# Patient Record
Sex: FEMALE | Race: WHITE | NOT HISPANIC OR LATINO | Employment: OTHER | URBAN - METROPOLITAN AREA
[De-identification: names, ages, dates, MRNs, and addresses within clinical notes are randomized per-mention and may not be internally consistent; named-entity substitution may affect disease eponyms.]

---

## 2017-05-15 ENCOUNTER — HISTORICAL (OUTPATIENT)
Dept: LAB | Facility: HOSPITAL | Age: 77
End: 2017-05-15

## 2017-05-15 LAB
BNP BLD-MCNC: 170 PG/ML (ref 0–125)
BUN SERPL-MCNC: 18 MG/DL (ref 7–18)
CALCIUM SERPL-MCNC: 9.2 MG/DL (ref 8.5–10.1)
CHLORIDE SERPL-SCNC: 103 MMOL/L (ref 98–107)
CO2 SERPL-SCNC: 29 MMOL/L (ref 21–32)
CREAT SERPL-MCNC: 0.75 MG/DL (ref 0.55–1.02)
GLUCOSE SERPL-MCNC: 73 MG/DL (ref 74–106)
POTASSIUM SERPL-SCNC: 4.4 MMOL/L (ref 3.5–5.1)
SODIUM SERPL-SCNC: 142 MMOL/L (ref 136–145)

## 2017-05-16 ENCOUNTER — HISTORICAL (OUTPATIENT)
Dept: CARDIOLOGY | Facility: HOSPITAL | Age: 77
End: 2017-05-16

## 2017-05-18 ENCOUNTER — HISTORICAL (OUTPATIENT)
Dept: LAB | Facility: HOSPITAL | Age: 77
End: 2017-05-18

## 2017-05-18 LAB
ABS NEUT (OLG): 5.15 X10(3)/MCL (ref 2.1–9.2)
ALBUMIN SERPL-MCNC: 3.6 GM/DL (ref 3.4–5)
ALBUMIN/GLOB SERPL: 1.1 {RATIO}
ALP SERPL-CCNC: 81 UNIT/L (ref 38–126)
ALT SERPL-CCNC: 30 UNIT/L (ref 12–78)
APPEARANCE, UA: CLEAR
APTT PPP: 27 SECOND(S) (ref 20.6–36)
AST SERPL-CCNC: 16 UNIT/L (ref 15–37)
BACTERIA SPEC CULT: ABNORMAL /HPF
BASOPHILS # BLD AUTO: 0 X10(3)/MCL (ref 0–0.2)
BASOPHILS NFR BLD AUTO: 1 %
BILIRUB SERPL-MCNC: 0.5 MG/DL (ref 0.2–1)
BILIRUB UR QL STRIP: NEGATIVE
BILIRUBIN DIRECT+TOT PNL SERPL-MCNC: 0.1 MG/DL (ref 0–0.2)
BILIRUBIN DIRECT+TOT PNL SERPL-MCNC: 0.4 MG/DL (ref 0–0.8)
BUN SERPL-MCNC: 19 MG/DL (ref 7–18)
CALCIUM SERPL-MCNC: 9 MG/DL (ref 8.5–10.1)
CHLORIDE SERPL-SCNC: 102 MMOL/L (ref 98–107)
CO2 SERPL-SCNC: 30 MMOL/L (ref 21–32)
COLOR UR: YELLOW
CREAT SERPL-MCNC: 0.92 MG/DL (ref 0.55–1.02)
EOSINOPHIL # BLD AUTO: 0.3 X10(3)/MCL (ref 0–0.9)
EOSINOPHIL NFR BLD AUTO: 3 %
ERYTHROCYTE [DISTWIDTH] IN BLOOD BY AUTOMATED COUNT: 15.8 % (ref 11.5–17)
GLOBULIN SER-MCNC: 3.2 GM/DL (ref 2.4–3.5)
GLUCOSE (UA): NEGATIVE
GLUCOSE SERPL-MCNC: 146 MG/DL (ref 74–106)
HCT VFR BLD AUTO: 47.2 % (ref 37–47)
HGB BLD-MCNC: 15.4 GM/DL (ref 12–16)
HGB UR QL STRIP: NEGATIVE
INR PPP: 1.03 (ref 0–1.27)
KETONES UR QL STRIP: NEGATIVE
LEUKOCYTE ESTERASE UR QL STRIP: ABNORMAL
LYMPHOCYTES # BLD AUTO: 2 X10(3)/MCL (ref 0.6–4.6)
LYMPHOCYTES NFR BLD AUTO: 25 %
MCH RBC QN AUTO: 31 PG (ref 27–31)
MCHC RBC AUTO-ENTMCNC: 32.6 GM/DL (ref 33–36)
MCV RBC AUTO: 95 FL (ref 80–94)
MONOCYTES # BLD AUTO: 0.5 X10(3)/MCL (ref 0.1–1.3)
MONOCYTES NFR BLD AUTO: 7 %
MRSA SCREEN BY PCR: NEGATIVE
NEUTROPHILS # BLD AUTO: 5.15 X10(3)/MCL (ref 1.4–7.9)
NEUTROPHILS NFR BLD AUTO: 64 %
NITRITE UR QL STRIP: NEGATIVE
PH UR STRIP: 5.5 [PH] (ref 5–9)
PLATELET # BLD AUTO: 245 X10(3)/MCL (ref 130–400)
PMV BLD AUTO: 9.7 FL (ref 9.4–12.4)
POTASSIUM SERPL-SCNC: 4.5 MMOL/L (ref 3.5–5.1)
PROT SERPL-MCNC: 6.8 GM/DL (ref 6.4–8.2)
PROT UR QL STRIP: NEGATIVE
PROTHROMBIN TIME: 13.3 SECOND(S) (ref 12.1–14.2)
RBC # BLD AUTO: 4.97 X10(6)/MCL (ref 4.2–5.4)
RBC #/AREA URNS HPF: ABNORMAL /[HPF]
SODIUM SERPL-SCNC: 139 MMOL/L (ref 136–145)
SP GR UR STRIP: 1.02 (ref 1–1.03)
SQUAMOUS EPITHELIAL, UA: ABNORMAL
UROBILINOGEN UR STRIP-ACNC: 0.2
WBC # SPEC AUTO: 8.1 X10(3)/MCL (ref 4.5–11.5)
WBC #/AREA URNS HPF: ABNORMAL /[HPF]

## 2017-06-23 ENCOUNTER — HISTORICAL (OUTPATIENT)
Dept: ADMINISTRATIVE | Facility: HOSPITAL | Age: 77
End: 2017-06-23

## 2017-07-14 ENCOUNTER — HISTORICAL (OUTPATIENT)
Dept: ADMINISTRATIVE | Facility: HOSPITAL | Age: 77
End: 2017-07-14

## 2017-07-14 LAB — ERYTHROCYTE [SEDIMENTATION RATE] IN BLOOD: 37 MM/HR (ref 0–20)

## 2019-06-26 ENCOUNTER — TELEPHONE (OUTPATIENT)
Dept: GYNECOLOGIC ONCOLOGY | Facility: CLINIC | Age: 79
End: 2019-06-26

## 2019-06-27 RX ORDER — DOCUSATE SODIUM 100 MG/1
100 CAPSULE, LIQUID FILLED ORAL DAILY PRN
COMMUNITY

## 2019-06-27 RX ORDER — APIXABAN 5 MG/1
5 TABLET, FILM COATED ORAL 2 TIMES DAILY
Refills: 1 | COMMUNITY
Start: 2019-04-30

## 2019-06-27 RX ORDER — METOPROLOL TARTRATE 50 MG/1
50 TABLET ORAL 2 TIMES DAILY
Refills: 1 | Status: ON HOLD | COMMUNITY
Start: 2019-04-30 | End: 2019-08-02 | Stop reason: SDUPTHER

## 2019-06-27 RX ORDER — HYDROCODONE BITARTRATE AND ACETAMINOPHEN 10; 325 MG/1; MG/1
TABLET ORAL
Refills: 0 | COMMUNITY
Start: 2019-05-08

## 2019-06-27 RX ORDER — GABAPENTIN 300 MG/1
300 CAPSULE ORAL NIGHTLY
Refills: 7 | COMMUNITY
Start: 2019-05-30

## 2019-06-27 RX ORDER — ASPIRIN 81 MG/1
81 TABLET ORAL DAILY
COMMUNITY
End: 2019-08-20

## 2019-07-02 ENCOUNTER — TELEPHONE (OUTPATIENT)
Dept: GYNECOLOGIC ONCOLOGY | Facility: CLINIC | Age: 79
End: 2019-07-02

## 2019-07-03 ENCOUNTER — DOCUMENTATION ONLY (OUTPATIENT)
Dept: GYNECOLOGIC ONCOLOGY | Facility: CLINIC | Age: 79
End: 2019-07-03

## 2019-07-03 ENCOUNTER — CLINICAL SUPPORT (OUTPATIENT)
Dept: GYNECOLOGIC ONCOLOGY | Facility: CLINIC | Age: 79
End: 2019-07-03
Payer: MEDICARE

## 2019-07-03 ENCOUNTER — RESEARCH ENCOUNTER (OUTPATIENT)
Dept: RESEARCH | Facility: OTHER | Age: 79
End: 2019-07-03

## 2019-07-03 ENCOUNTER — TELEPHONE (OUTPATIENT)
Dept: GYNECOLOGIC ONCOLOGY | Facility: CLINIC | Age: 79
End: 2019-07-03

## 2019-07-03 ENCOUNTER — HOSPITAL ENCOUNTER (OUTPATIENT)
Dept: RADIOLOGY | Facility: OTHER | Age: 79
Discharge: HOME | End: 2019-07-03
Attending: OBSTETRICS & GYNECOLOGY
Payer: MEDICARE

## 2019-07-03 ENCOUNTER — INITIAL CONSULT (OUTPATIENT)
Dept: GYNECOLOGIC ONCOLOGY | Facility: CLINIC | Age: 79
End: 2019-07-03
Payer: MEDICARE

## 2019-07-03 VITALS
HEART RATE: 51 BPM | HEIGHT: 64 IN | WEIGHT: 212.75 LBS | SYSTOLIC BLOOD PRESSURE: 136 MMHG | DIASTOLIC BLOOD PRESSURE: 65 MMHG | BODY MASS INDEX: 36.32 KG/M2

## 2019-07-03 DIAGNOSIS — C54.1 PAPILLARY SEROUS ENDOMETRIAL ADENOCARCINOMA: Primary | ICD-10-CM

## 2019-07-03 DIAGNOSIS — C54.1 PAPILLARY SEROUS ENDOMETRIAL ADENOCARCINOMA: ICD-10-CM

## 2019-07-03 PROCEDURE — 74177 CT ABD & PELVIS W/CONTRAST: CPT | Mod: 26,,, | Performed by: RADIOLOGY

## 2019-07-03 PROCEDURE — 99999 PR PBB SHADOW E&M-EST. PATIENT-LVL III: CPT | Mod: PBBFAC,,, | Performed by: OBSTETRICS & GYNECOLOGY

## 2019-07-03 PROCEDURE — 99205 PR OFFICE/OUTPT VISIT, NEW, LEVL V, 60-74 MIN: ICD-10-PCS | Mod: S$PBB,,, | Performed by: OBSTETRICS & GYNECOLOGY

## 2019-07-03 PROCEDURE — 99213 OFFICE O/P EST LOW 20 MIN: CPT | Mod: PBBFAC,25 | Performed by: OBSTETRICS & GYNECOLOGY

## 2019-07-03 PROCEDURE — 74177 CT CHEST ABDOMEN PELVIS WITH CONTRAST (XPD): ICD-10-PCS | Mod: 26,,, | Performed by: RADIOLOGY

## 2019-07-03 PROCEDURE — 99999 PR PBB SHADOW E&M-EST. PATIENT-LVL III: ICD-10-PCS | Mod: PBBFAC,,, | Performed by: OBSTETRICS & GYNECOLOGY

## 2019-07-03 PROCEDURE — 71260 CT THORAX DX C+: CPT | Mod: 26,,, | Performed by: RADIOLOGY

## 2019-07-03 PROCEDURE — 25500020 PHARM REV CODE 255: Performed by: OBSTETRICS & GYNECOLOGY

## 2019-07-03 PROCEDURE — 99205 OFFICE O/P NEW HI 60 MIN: CPT | Mod: S$PBB,,, | Performed by: OBSTETRICS & GYNECOLOGY

## 2019-07-03 PROCEDURE — 71260 CT CHEST ABDOMEN PELVIS WITH CONTRAST (XPD): ICD-10-PCS | Mod: 26,,, | Performed by: RADIOLOGY

## 2019-07-03 PROCEDURE — 74177 CT ABD & PELVIS W/CONTRAST: CPT | Mod: TC

## 2019-07-03 RX ADMIN — IOHEXOL 100 ML: 350 INJECTION, SOLUTION INTRAVENOUS at 02:07

## 2019-07-03 RX ADMIN — IOHEXOL 30 ML: 350 INJECTION, SOLUTION INTRAVENOUS at 01:07

## 2019-07-03 NOTE — LETTER
July 6, 2019      Qamar Pires MD  [address] [facility]  [place] [address]           [address]  [address], [address]  [facility] [address]  Phone: [phone]  Fax: [phone]          Patient: Shalini Alcaraz   MR Number: [id number]   YOB: 1940   Date of Visit: 7/3/2019       Dear Dr. Qamar Pires:    Thank you for referring Shalini Alcaraz to me for evaluation. Attached you will find relevant portions of my assessment and plan of care.    If you have questions, please do not hesitate to call me. I look forward to following Shalini Alcaraz along with you.    Sincerely,    Tamika Quesada MD    Enclosure  CC:  Carlos Newell MD    If you would like to receive this communication electronically, please contact [email or web address] or [phone] to request more information on [employer] access.    For providers and/or their staff who would like to refer a patient to [facility], please contact us through our one-stop-shop provider referral line, [facility], at [phone].    If you feel you have received this communication in error or would no longer like to receive these types of communications, please e-mail [email or web address]

## 2019-07-03 NOTE — PROGRESS NOTES
Referred by Dr. Qamar Pires for newly diagnosed endometrial cancer.    Postmenopausal bleeding.   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    Pelvic US 6/6/19  UT 4.8x3.8x8.0cm  Thickened endometrium 2.0cm  ROV 1.1cm and LOV 2.4cm  Left pelvic kidney and parapelvic cyst 4.1cm    Pap 6/3/19 negative.     Medical comorbidities include afib on long term anticoagulation with Eliquis and valve replacement, HTN     Prior abdominal surgeries include appendectomy, cholecystectomy. She has a pelvic kidney. BMI 31.      No family history of breast, colon, ovarian, or uterine cancer.

## 2019-07-03 NOTE — PROGRESS NOTES
Pt and her husand were approached in Gyn Onc clinic regarding participation in IRB protocol #2015.101.C. Pt was agreeable.     The Informed Consent Form (ICF) was reviewed with pt. The discussion included:   - participation is voluntary  - pt can change her mind about participating  - pt was informed that participation in this study would not preclude her from participating in any other research if offered  - specimens may be used by [facility] researchers, community researchers or research companies  - specimens collected include only those discussed with the patient at the time of consent and are indicated on the ICF   - specimens may be used for DNA, RNA or protein studies investigating biomarkers for diagnostic, prognostic or treatment purposes  - blood specimen will be collected from surgery after routine collections have been conducted  - excess tumor tissue will be collected from Pathology after their approval  - participation in Biobank study will not change amount of tissue removed  - all specimens released to researchers will be stripped of identifiers  - no personal medical information will be released to any parties outside of this research study  - there will be no other physical risks outside of those involved in standard of care procedure     Dr. Quesada approved of patient's participation in the Biobank study. Pt did not have any questions. Pt willingly and independently signed ICF.    A copy of signed ICF was given to pt with instructions to call with any questions that may arise or if she should change her mind regarding participation in Biobank study.

## 2019-07-03 NOTE — PROGRESS NOTES
Navigation intake completed. Pt is accompanied by her , retired internal med physician. Pt went for labs and CT today due to distant residence. Pt denies any concerns currently and expressed verbal understanding to contact office for further needs or concerns.

## 2019-07-05 ENCOUNTER — TELEPHONE (OUTPATIENT)
Dept: GYNECOLOGIC ONCOLOGY | Facility: CLINIC | Age: 79
End: 2019-07-05

## 2019-07-05 NOTE — TELEPHONE ENCOUNTER
Called and reviewed CT with patient. No obvious evidence of metastatic disease. Small subcentimeter pulm nodules, will follow with subsequent exam. Plan to proceed with surgery as scheduled. She voiced understanding, all questions answered.

## 2019-07-06 PROBLEM — C54.1 PAPILLARY SEROUS ENDOMETRIAL ADENOCARCINOMA: Status: ACTIVE | Noted: 2019-07-06

## 2019-07-06 RX ORDER — LIDOCAINE HYDROCHLORIDE 10 MG/ML
1 INJECTION, SOLUTION EPIDURAL; INFILTRATION; INTRACAUDAL; PERINEURAL ONCE
Status: CANCELLED | OUTPATIENT
Start: 2019-07-06 | End: 2019-07-06

## 2019-07-06 RX ORDER — SODIUM CHLORIDE 9 MG/ML
INJECTION, SOLUTION INTRAVENOUS CONTINUOUS
Status: CANCELLED | OUTPATIENT
Start: 2019-07-06

## 2019-07-06 NOTE — H&P (VIEW-ONLY)
Subjective:      Patient ID: Shalini Alcaraz is a 78 y.o. female.    Chief Complaint: high grade serous carcinoma (consult)      HPI  Referred by Dr. Qamar Pires for newly diagnosed endometrial cancer.     Postmenopausal bleeding.   EMB 19 showed high grade serous carcinoma.     Pelvic US 19  UT 4.8x3.8x8.0cm  Thickened endometrium 2.0cm  ROV 1.1cm and LOV 2.4cm  Left pelvic kidney and parapelvic cyst 4.1cm     Pap 6/3/19 negative.      Medical comorbidities include afib on long term anticoagulation with Eliquis and mitral valve replacement for MVP, HTN. Her cardiologist is Dr. Carlos Nweell in [place]. She has been off Eliquis since she started with postmenopausal bleeding in coordination with Dr. Newell.      Prior abdominal surgeries include open appendectomy, laparoscopic cholecystectomy.  x 3. She has a pelvic kidney. BMI 31.       No family history of breast, colon, ovarian, or uterine cancer.   Review of Systems   Constitutional: Negative for appetite change, chills, fatigue and fever.   HENT: Negative for mouth sores.    Respiratory: Negative for cough and shortness of breath.    Cardiovascular: Negative for leg swelling.   Gastrointestinal: Negative for abdominal pain, blood in stool, constipation and diarrhea.   Endocrine: Negative for cold intolerance.   Genitourinary: Negative for dysuria and vaginal bleeding.   Musculoskeletal: Negative for myalgias.   Skin: Negative for rash.   Allergic/Immunologic: Negative.    Neurological: Negative for weakness and numbness.   Hematological: Negative for adenopathy. Does not bruise/bleed easily.   Psychiatric/Behavioral: Negative for confusion.       Past Medical History:   Diagnosis Date    Anxiety     Atrial fibrillation     Avulsion fracture of left ankle     Collagenous colitis     Degenerative joint disease of spine     Hypertension     Low back pain     Lumbar facet arthropathy     Lumbar herniated disc     Lumbar radiculopathy      Lumbar spondylosis     Neuroforaminal stenosis of lumbar spine     Osteoarthritis     Papillary serous endometrial adenocarcinoma 2019    Post-menopausal bleeding     Spinal stenosis      Past Surgical History:   Procedure Laterality Date    AORTIC VALVE REPLACEMENT  2017    APPENDECTOMY      CHOLECYSTECTOMY      COLONOSCOPY W/ 2017    KNEE SURGERY      neural ablation       Family History   Problem Relation Age of Onset    Hypertension Mother     Lupus Sister      Social History     Socioeconomic History    Marital status:      Spouse name: Not on file    Number of children: Not on file    Years of education: Not on file    Highest education level: Not on file   Occupational History    Not on file   Social Needs    Financial resource strain: Not on file    Food insecurity:     Worry: Not on file     Inability: Not on file    Transportation needs:     Medical: Not on file     Non-medical: Not on file   Tobacco Use    Smoking status: Former Smoker     Last attempt to quit: 2018     Years since quittin.4   Substance and Sexual Activity    Alcohol use: Yes     Alcohol/week: 0.6 - 1.2 oz     Types: 1 - 2 Glasses of wine per week    Drug use: Never    Sexual activity: Not on file   Lifestyle    Physical activity:     Days per week: Not on file     Minutes per session: Not on file    Stress: Not on file   Relationships    Social connections:     Talks on phone: Not on file     Gets together: Not on file     Attends Cheondoism service: Not on file     Active member of club or organization: Not on file     Attends meetings of clubs or organizations: Not on file     Relationship status: Not on file   Other Topics Concern    Not on file   Social History Narrative    Not on file     Current Outpatient Medications   Medication Sig    aspirin (ECOTRIN) 81 MG EC tablet Take 81 mg by mouth once daily.    docusate sodium (COLACE) 100 MG capsule Take 100 mg by mouth  daily as needed for Constipation.    ELIQUIS 5 mg Tab Take 5 mg by mouth 2 (two) times daily.    gabapentin (NEURONTIN) 300 MG capsule Take 600 mg by mouth nightly.    glucosamine/chondr nickerson A sod (OSTEO BI-FLEX ORAL) Take by mouth once daily.    HYDROcodone-acetaminophen (NORCO)  mg per tablet TAKE 1 TABLET BY MOUTH EVERY 6 HOURS May cause drowsiness no alcohol    metoprolol tartrate (LOPRESSOR) 50 MG tablet Take 50 mg by mouth 2 (two) times daily.     No current facility-administered medications for this visit.      Review of patient's allergies indicates:  No Known Allergies    Objective:   Physical Exam:   Constitutional: She is oriented to person, place, and time. She appears well-developed and well-nourished.    HENT:   Head: Normocephalic and atraumatic.    Eyes: Pupils are equal, round, and reactive to light. EOM are normal.    Neck: Normal range of motion. Neck supple. No thyromegaly present.    Cardiovascular: Normal rate, regular rhythm and intact distal pulses.     Pulmonary/Chest: Effort normal and breath sounds normal. No respiratory distress. She has no wheezes.        Abdominal: Soft. Bowel sounds are normal. She exhibits no distension, no ascites and no mass. There is no tenderness.     Genitourinary: Rectum normal, vagina normal and uterus normal. Pelvic exam was performed with patient supine. There is no lesion on the right labia. There is no lesion on the left labia. Uterus is not enlarged and not fixed. Cervix is normal. Right adnexum displays no mass. Left adnexum displays no mass.           Musculoskeletal: Normal range of motion and moves all extremeties.      Lymphadenopathy:     She has no cervical adenopathy.        Right: No inguinal and no supraclavicular adenopathy present.        Left: No inguinal and no supraclavicular adenopathy present.    Neurological: She is alert and oriented to person, place, and time.    Skin: Skin is warm and dry. No rash noted.    Psychiatric: She  has a normal mood and affect.       Assessment:     1. Papillary serous endometrial adenocarcinoma        Plan:     Orders Placed This Encounter   Procedures    CT Chest Abdoment Pelvis With Contrast    Creatinine, serum       I discussed with the patient and her  the natural history of endometrial cancer. We also reviewed papillary serous histology which can be a more aggressive histology. Will obtain CT CAP for metastatic survey. Standard manage include surgical staging followed by adjuvant therapy based on surgicopathologic findings. She is a candidate for minimally invasive approach. Recommend RTLH/BSO/staging. She desires to proceed. The risks, benefits, and indications of the procedure were discussed with the patient and her family members if present.  These included bleeding, transfusion, infection, damage to surrounding tissues (bowel, bladder, ureter), wound separation, conversion to laparotomy, perioperative cardiac events, VTE, pneumonia, and possible death.  She voiced understanding, all questions were answered and consents were signed.  1. RTLH/BSO/staging 8/16/19, will try to move up surgery date if possible, she has a flexible schedule  2. CT CAP today  3. Pre op anesthesia consult  4. Continue holding Eliquis until after surgery    Copy to Adolfo Pires and Reece for coordination of care and care update.

## 2019-07-06 NOTE — PROGRESS NOTES
Subjective:      Patient ID: Shalini Alcaraz is a 78 y.o. female.    Chief Complaint: high grade serous carcinoma (consult)      HPI  Referred by Dr. Qamar Pires for newly diagnosed endometrial cancer.     Postmenopausal bleeding.   EMB 19 showed high grade serous carcinoma.     Pelvic US 19  UT 4.8x3.8x8.0cm  Thickened endometrium 2.0cm  ROV 1.1cm and LOV 2.4cm  Left pelvic kidney and parapelvic cyst 4.1cm     Pap 6/3/19 negative.      Medical comorbidities include afib on long term anticoagulation with Eliquis and mitral valve replacement for MVP, HTN. Her cardiologist is Dr. Carlos Newell in [place]. She has been off Eliquis since she started with postmenopausal bleeding in coordination with Dr. Newell.      Prior abdominal surgeries include open appendectomy, laparoscopic cholecystectomy.  x 3. She has a pelvic kidney. BMI 31.       No family history of breast, colon, ovarian, or uterine cancer.   Review of Systems   Constitutional: Negative for appetite change, chills, fatigue and fever.   HENT: Negative for mouth sores.    Respiratory: Negative for cough and shortness of breath.    Cardiovascular: Negative for leg swelling.   Gastrointestinal: Negative for abdominal pain, blood in stool, constipation and diarrhea.   Endocrine: Negative for cold intolerance.   Genitourinary: Negative for dysuria and vaginal bleeding.   Musculoskeletal: Negative for myalgias.   Skin: Negative for rash.   Allergic/Immunologic: Negative.    Neurological: Negative for weakness and numbness.   Hematological: Negative for adenopathy. Does not bruise/bleed easily.   Psychiatric/Behavioral: Negative for confusion.       Past Medical History:   Diagnosis Date    Anxiety     Atrial fibrillation     Avulsion fracture of left ankle     Collagenous colitis     Degenerative joint disease of spine     Hypertension     Low back pain     Lumbar facet arthropathy     Lumbar herniated disc     Lumbar radiculopathy      Lumbar spondylosis     Neuroforaminal stenosis of lumbar spine     Osteoarthritis     Papillary serous endometrial adenocarcinoma 2019    Post-menopausal bleeding     Spinal stenosis      Past Surgical History:   Procedure Laterality Date    AORTIC VALVE REPLACEMENT  2017    APPENDECTOMY      CHOLECYSTECTOMY      COLONOSCOPY W/ 2017    KNEE SURGERY      neural ablation       Family History   Problem Relation Age of Onset    Hypertension Mother     Lupus Sister      Social History     Socioeconomic History    Marital status:      Spouse name: Not on file    Number of children: Not on file    Years of education: Not on file    Highest education level: Not on file   Occupational History    Not on file   Social Needs    Financial resource strain: Not on file    Food insecurity:     Worry: Not on file     Inability: Not on file    Transportation needs:     Medical: Not on file     Non-medical: Not on file   Tobacco Use    Smoking status: Former Smoker     Last attempt to quit: 2018     Years since quittin.4   Substance and Sexual Activity    Alcohol use: Yes     Alcohol/week: 0.6 - 1.2 oz     Types: 1 - 2 Glasses of wine per week    Drug use: Never    Sexual activity: Not on file   Lifestyle    Physical activity:     Days per week: Not on file     Minutes per session: Not on file    Stress: Not on file   Relationships    Social connections:     Talks on phone: Not on file     Gets together: Not on file     Attends Pentecostalism service: Not on file     Active member of club or organization: Not on file     Attends meetings of clubs or organizations: Not on file     Relationship status: Not on file   Other Topics Concern    Not on file   Social History Narrative    Not on file     Current Outpatient Medications   Medication Sig    aspirin (ECOTRIN) 81 MG EC tablet Take 81 mg by mouth once daily.    docusate sodium (COLACE) 100 MG capsule Take 100 mg by mouth  daily as needed for Constipation.    ELIQUIS 5 mg Tab Take 5 mg by mouth 2 (two) times daily.    gabapentin (NEURONTIN) 300 MG capsule Take 600 mg by mouth nightly.    glucosamine/chondr nickerson A sod (OSTEO BI-FLEX ORAL) Take by mouth once daily.    HYDROcodone-acetaminophen (NORCO)  mg per tablet TAKE 1 TABLET BY MOUTH EVERY 6 HOURS May cause drowsiness no alcohol    metoprolol tartrate (LOPRESSOR) 50 MG tablet Take 50 mg by mouth 2 (two) times daily.     No current facility-administered medications for this visit.      Review of patient's allergies indicates:  No Known Allergies    Objective:   Physical Exam:   Constitutional: She is oriented to person, place, and time. She appears well-developed and well-nourished.    HENT:   Head: Normocephalic and atraumatic.    Eyes: Pupils are equal, round, and reactive to light. EOM are normal.    Neck: Normal range of motion. Neck supple. No thyromegaly present.    Cardiovascular: Normal rate, regular rhythm and intact distal pulses.     Pulmonary/Chest: Effort normal and breath sounds normal. No respiratory distress. She has no wheezes.        Abdominal: Soft. Bowel sounds are normal. She exhibits no distension, no ascites and no mass. There is no tenderness.     Genitourinary: Rectum normal, vagina normal and uterus normal. Pelvic exam was performed with patient supine. There is no lesion on the right labia. There is no lesion on the left labia. Uterus is not enlarged and not fixed. Cervix is normal. Right adnexum displays no mass. Left adnexum displays no mass.           Musculoskeletal: Normal range of motion and moves all extremeties.      Lymphadenopathy:     She has no cervical adenopathy.        Right: No inguinal and no supraclavicular adenopathy present.        Left: No inguinal and no supraclavicular adenopathy present.    Neurological: She is alert and oriented to person, place, and time.    Skin: Skin is warm and dry. No rash noted.    Psychiatric: She  has a normal mood and affect.       Assessment:     1. Papillary serous endometrial adenocarcinoma        Plan:     Orders Placed This Encounter   Procedures    CT Chest Abdoment Pelvis With Contrast    Creatinine, serum       I discussed with the patient and her  the natural history of endometrial cancer. We also reviewed papillary serous histology which can be a more aggressive histology. Will obtain CT CAP for metastatic survey. Standard manage include surgical staging followed by adjuvant therapy based on surgicopathologic findings. She is a candidate for minimally invasive approach. Recommend RTLH/BSO/staging. She desires to proceed. The risks, benefits, and indications of the procedure were discussed with the patient and her family members if present.  These included bleeding, transfusion, infection, damage to surrounding tissues (bowel, bladder, ureter), wound separation, conversion to laparotomy, perioperative cardiac events, VTE, pneumonia, and possible death.  She voiced understanding, all questions were answered and consents were signed.  1. RTLH/BSO/staging 8/16/19, will try to move up surgery date if possible, she has a flexible schedule  2. CT CAP today  3. Pre op anesthesia consult  4. Continue holding Eliquis until after surgery    Copy to Adolfo Pires and Reece for coordination of care and care update.

## 2019-07-08 ENCOUNTER — TELEPHONE (OUTPATIENT)
Dept: GYNECOLOGIC ONCOLOGY | Facility: CLINIC | Age: 79
End: 2019-07-08

## 2019-07-09 ENCOUNTER — TELEPHONE (OUTPATIENT)
Dept: GYNECOLOGIC ONCOLOGY | Facility: CLINIC | Age: 79
End: 2019-07-09

## 2019-07-10 ENCOUNTER — TELEPHONE (OUTPATIENT)
Dept: GYNECOLOGIC ONCOLOGY | Facility: CLINIC | Age: 79
End: 2019-07-10

## 2019-07-10 NOTE — TELEPHONE ENCOUNTER
Spoke with pt regarding moving her surgery date to a sooner date. Pt agreed to move her surgery to 7/31/19 at [facility]. Appt for surgery at [facility] cancelled and new appts with surgery information placed in mail.

## 2019-07-16 ENCOUNTER — ANESTHESIA EVENT (OUTPATIENT)
Dept: SURGERY | Facility: HOSPITAL | Age: 79
DRG: 734 | End: 2019-07-16
Payer: MEDICARE

## 2019-07-16 DIAGNOSIS — Z01.818 PREOP TESTING: Primary | ICD-10-CM

## 2019-07-16 NOTE — PRE-PROCEDURE INSTRUCTIONS
Spoke to pt . She lives in [place], [place]. Requesting her cardiologist & PCP for clearance for surgery. Pt coming in day before surgery.

## 2019-07-16 NOTE — ANESTHESIA PREPROCEDURE EVALUATION
[facility]  Anesthesia Pre-Operative Evaluation         Patient Name: Shalini Alcaraz  YOB: 1940  MRN: [id number]    SUBJECTIVE:     Pre-operative evaluation for Procedure(s) (LRB):  XI ROBOTIC HYSTERECTOMY (N/A)  XI ROBOTIC SALPINGO-OOPHORECTOMY (Bilateral)  STAGING (N/A)     07/31/2019    Shalini Alcaraz is a 78 y.o. female w/ a significant PMHx of newly diagnosed endometrial cancer (high grade serous carcinoma, a fib on apixaban s/p valve replacement, HTN, CHF- unknown EF presents for above procedure.     Pshx includes appendectomy, cholecystectomy.    Cleared by PCP for surgery.     Patient now presents for the above procedure(s).    LDA: None documented.     Prev airway: None documented.    Drips: None documented.      Patient Active Problem List   Diagnosis    Papillary serous endometrial adenocarcinoma       Review of patient's allergies indicates:   Allergen Reactions    Nsaids (non-steroidal anti-inflammatory drug)      Causes diarrhea    Pentothal [thiopental sodium]      Nausea & vomitting       Current Inpatient Medications:      No current facility-administered medications on file prior to encounter.      Current Outpatient Medications on File Prior to Encounter   Medication Sig Dispense Refill    aspirin (ECOTRIN) 81 MG EC tablet Take 81 mg by mouth once daily.      docusate sodium (COLACE) 100 MG capsule Take 100 mg by mouth daily as needed for Constipation.      ELIQUIS 5 mg Tab Take 5 mg by mouth 2 (two) times daily.  1    gabapentin (NEURONTIN) 300 MG capsule Take 300 mg by mouth every evening.   7    glucosamine/chondr nickerson A sod (OSTEO BI-FLEX ORAL) Take by mouth once daily.      HYDROcodone-acetaminophen (NORCO)  mg per tablet TAKE 1 TABLET BY MOUTH EVERY 6 HOURS May cause drowsiness no alcohol  0    metoprolol tartrate (LOPRESSOR) 50 MG tablet Take 50 mg by mouth 2 (two) times daily.  1       Past Surgical History:   Procedure Laterality Date    AORTIC  VALVE REPLACEMENT  2017    APPENDECTOMY      CHOLECYSTECTOMY      COLONOSCOPY W/   2017    KNEE SURGERY      neural ablation         Social History     Socioeconomic History    Marital status:      Spouse name: Not on file    Number of children: Not on file    Years of education: Not on file    Highest education level: Not on file   Occupational History    Not on file   Social Needs    Financial resource strain: Not on file    Food insecurity:     Worry: Not on file     Inability: Not on file    Transportation needs:     Medical: Not on file     Non-medical: Not on file   Tobacco Use    Smoking status: Former Smoker     Last attempt to quit: 2018     Years since quittin.5   Substance and Sexual Activity    Alcohol use: Yes     Alcohol/week: 0.6 - 1.2 oz     Types: 1 - 2 Glasses of wine per week    Drug use: Never    Sexual activity: Not on file   Lifestyle    Physical activity:     Days per week: Not on file     Minutes per session: Not on file    Stress: Not on file   Relationships    Social connections:     Talks on phone: Not on file     Gets together: Not on file     Attends Church service: Not on file     Active member of club or organization: Not on file     Attends meetings of clubs or organizations: Not on file     Relationship status: Not on file   Other Topics Concern    Not on file   Social History Narrative    Not on file       OBJECTIVE:     Vital Signs Range (Last 24H):  Temp:  [36.8 °C (98.3 °F)]   Pulse:  [95]   Resp:  [18]   BP: (124)/(76)   SpO2:  [97 %]       Significant Labs:  Lab Results   Component Value Date    CREATININE 0.8 2019       Diagnostic Studies: No relevant studies.    EKG: No recent studies available.    2D ECHO:  No results found for this or any previous visit.      ASSESSMENT/PLAN:                   Anesthesia Assessment: Preoperative EQUATION     Planned Procedure: Procedure(s) (LRB):  XI ROBOTIC HYSTERECTOMY (N/A)  XI  ROBOTIC SALPINGO-OOPHORECTOMY (Bilateral)  STAGING (N/A)  Requested Anesthesia Type:General  Surgeon: Tamika Quesada MD  Service: OB/GYN  Known or anticipated Date of Surgery:7/31/2019     Surgeon notes: reviewed     Electronic QUestionnaire Assessment completed via nurse interview with patient.         No Aq           Triage considerations:         Previous anesthesia records:Not available     Last PCP note: outside [facility]  Dr. Janeth López     Subspecialty notes: Cardiology: General Dr. Newell     Other important co-morbidities:   Papillary serous endometrial adencarcinoma  Anxiety  Atrial fibrillation  Degenerative joint disease  HTN  Low back pain  Lumbar radiculopathy  Stenosis of spine  Post-menopausal bleeding   Hx MVP  --- replacement 2017  Has been on eliquis & asa        Tests already available:  No recent tests.                            Instructions given. (See in Nurse's note)     Optimization:  Pt lives in [place] coming in the evening before    Medical Opinion Indicated                            Sub-specialist consult indicated:   cardiology                                    Plan:    Testing:  Hematology Profile, CMP, T&S and EKG   Pre-anesthesia  visit                                        Visit focus: pt lives in [place] last surgery 2017 aortic valve replacement                           Consultation:Patient's PCP for a statement of optimization  cardiology clearance                            Navigation: Tests Scheduled.                         Consults scheduled.                        Results will be tracked by Preop Clinic.                                                                                                                                      07/31/2019  Shalini Alcaraz is a 78 y.o., female.    Anesthesia Evaluation    I have reviewed the Patient Summary Reports.    I have reviewed the Nursing Notes.   I have reviewed the Medications.     Review of Systems  Anesthesia  Hx:  No problems with previous Anesthesia Denies Hx of Anesthetic complications States has had PONV in past with sodium pentothal History of prior surgery of interest to airway management or planning: Previous anesthesia: General mvp repair not  here with general anesthesia.  Denies Family Hx of Anesthesia complications.   Denies Personal Hx of Anesthesia complications.   Social:  Former Smoker, Social Alcohol Use    Hematology/Oncology:         -- Anemia: Current/Recent Cancer. Oncology Comments: Papillary serous endometrial adenocarcinoma    EENT/Dental:EENT/Dental Normal   Cardiovascular:   Exercise tolerance: poor Hypertension, well controlled Valvular problems/Murmurs, MVP Denies CAD.   Dysrhythmias atrial fibrillation  Denies Angina. Porcine mitral valve replacement 2017  Has been on asa & eliquis--- stopped Eliquis on her own July 4 because of vaginal bleeding. Cardiologist aware. Performed TTE no thrombus identified.    Pulmonary:  Pulmonary Normal  Denies Sleep Apnea.    Renal/:  Renal/ Normal     Hepatic/GI:  Hepatic/GI Normal  Denies GERD.    Musculoskeletal:   Arthritis  Fell in December 2018  Osteoarthritis Spine Disorders: lumbar Degenerative disease and Chronic Pain    Neurological:   Denies CVA. Denies Seizures. Lumbar radiculopathy   Chronic Pain Syndrome   Endocrine:  Endocrine Normal Denies Diabetes.    Psych:   anxiety          Physical Exam  General:  Well nourished    Airway/Jaw/Neck:  Airway Findings: Mouth Opening: Normal Tongue: Normal  General Airway Assessment: Adult  Mallampati: III  Improves to II with phonation.  TM Distance: Normal, at least 6 cm  Jaw/Neck Findings:  Micrognathia: Negative Neck ROM: Normal ROM  Neck Findings:      Dental:  Dental Findings: In tact, Periodontal disease, Mild   Chest/Lungs:  Chest/Lungs Findings: Clear to auscultation, Normal Respiratory Rate     Heart/Vascular:  Heart Findings: Rate: Normal  Rhythm: Regular Rhythm  Sounds: Normal      Abdomen:  Abdomen Findings:  Normal     Musculoskeletal:  Musculoskeletal Findings:    Skin:  Skin Findings:     Mental Status:  Mental Status Findings:  Alert and Oriented, Cooperative         Anesthesia Plan  Type of Anesthesia, risks & benefits discussed:  Anesthesia Type:  general  Patient's Preference:   Intra-op Monitoring Plan: standard ASA monitors and arterial line  Intra-op Monitoring Plan Comments:   Post Op Pain Control Plan: multimodal analgesia and IV/PO Opioids PRN  Post Op Pain Control Plan Comments:   Induction:   IV  Beta Blocker:  Patient is on a Beta-Blocker and has received one dose within the past 24 hours (No further documentation required).       Informed Consent: Patient understands risks and agrees with Anesthesia plan.  Questions answered. Anesthesia consent signed with patient.  ASA Score: 3     Day of Surgery Review of History & Physical:    H&P update referred to the surgeon.     Anesthesia Plan Notes: Plan GETA. Possible 2nd PIV post-induction. Porcine MVR off anticoagulation, recent TTE without thrombus. All questions answered. Informed consent obtained. Pt agrees to proceed.           Ready For Surgery From Anesthesia Perspective.

## 2019-07-16 NOTE — PRE ADMISSION SCREENING
Anesthesia Assessment: Preoperative EQUATION    Planned Procedure: Procedure(s) (LRB):  XI ROBOTIC HYSTERECTOMY (N/A)  XI ROBOTIC SALPINGO-OOPHORECTOMY (Bilateral)  STAGING (N/A)  Requested Anesthesia Type:General  Surgeon: Tamika Quesada MD  Service: OB/GYN  Known or anticipated Date of Surgery:7/31/2019    Surgeon notes: reviewed    Electronic QUestionnaire Assessment completed via nurse interview with patient.        No Aq        Triage considerations:       Previous anesthesia records:Not available    Last PCP note: outside [facility]  Dr. Janeth López    Subspecialty notes: Cardiology: General Dr. Newell    Other important co-morbidities:   Papillary serous endometrial adencarcinoma  Anxiety  Atrial fibrillation  Degenerative joint disease  HTN  Low back pain  Lumbar radiculopathy  Stenosis of spine  Post-menopausal bleeding   Hx MVP  --- replacement 2017  Has been on eliquis & asa      Tests already available:  No recent tests.            Instructions given. (See in Nurse's note)    Optimization:  Pt lives in [place] coming in the evening before    Medical Opinion Indicated       Sub-specialist consult indicated:   cardiology       Plan:    Testing:  Hematology Profile, CMP, T&S and EKG   Pre-anesthesia  visit       Visit focus: pt lives in [place] last surgery 2017 aortic valve replacement     Consultation:Patient's PCP for a statement of optimization  cardiology clearance      Navigation: Tests Scheduled.              Consults scheduled.             Results will be tracked by Preop Clinic.

## 2019-07-31 ENCOUNTER — ANESTHESIA (OUTPATIENT)
Dept: SURGERY | Facility: HOSPITAL | Age: 79
DRG: 734 | End: 2019-07-31
Payer: MEDICARE

## 2019-07-31 ENCOUNTER — HOSPITAL ENCOUNTER (INPATIENT)
Facility: HOSPITAL | Age: 79
LOS: 2 days | Discharge: HOME | DRG: 734 | End: 2019-08-02
Attending: OBSTETRICS & GYNECOLOGY | Admitting: OBSTETRICS & GYNECOLOGY
Payer: MEDICARE

## 2019-07-31 DIAGNOSIS — Z90.710 S/P TOTAL ABDOMINAL HYSTERECTOMY: ICD-10-CM

## 2019-07-31 DIAGNOSIS — R00.0 TACHYCARDIA: ICD-10-CM

## 2019-07-31 DIAGNOSIS — Z90.710 S/P TOTAL ABDOMINAL HYSTERECTOMY: Primary | ICD-10-CM

## 2019-07-31 DIAGNOSIS — C54.1 PAPILLARY SEROUS ENDOMETRIAL ADENOCARCINOMA: ICD-10-CM

## 2019-07-31 LAB — POCT GLUCOSE: 109 MG/DL (ref 70–110)

## 2019-07-31 PROCEDURE — 88342 TISSUE SPECIMEN TO PATHOLOGY - SURGERY: ICD-10-PCS | Mod: 26,59,, | Performed by: PATHOLOGY

## 2019-07-31 PROCEDURE — 99900035 HC TECH TIME PER 15 MIN (STAT)

## 2019-07-31 PROCEDURE — 63600175 PHARM REV CODE 636 W HCPCS: Performed by: OBSTETRICS & GYNECOLOGY

## 2019-07-31 PROCEDURE — 93010 ELECTROCARDIOGRAM REPORT: CPT | Mod: ,,, | Performed by: INTERNAL MEDICINE

## 2019-07-31 PROCEDURE — 88307 TISSUE SPECIMEN TO PATHOLOGY - SURGERY: ICD-10-PCS | Mod: 26,,, | Performed by: PATHOLOGY

## 2019-07-31 PROCEDURE — D9220A PRA ANESTHESIA: Mod: ,,, | Performed by: ANESTHESIOLOGY

## 2019-07-31 PROCEDURE — 71000039 HC RECOVERY, EACH ADD'L HOUR: Performed by: OBSTETRICS & GYNECOLOGY

## 2019-07-31 PROCEDURE — D9220A PRA ANESTHESIA: ICD-10-PCS | Mod: ,,, | Performed by: ANESTHESIOLOGY

## 2019-07-31 PROCEDURE — 88309 TISSUE EXAM BY PATHOLOGIST: CPT | Mod: 26,,, | Performed by: PATHOLOGY

## 2019-07-31 PROCEDURE — 88305 TISSUE SPECIMEN TO PATHOLOGY - SURGERY: ICD-10-PCS | Mod: 26,,, | Performed by: PATHOLOGY

## 2019-07-31 PROCEDURE — 63600175 PHARM REV CODE 636 W HCPCS

## 2019-07-31 PROCEDURE — 93005 ELECTROCARDIOGRAM TRACING: CPT

## 2019-07-31 PROCEDURE — 94761 N-INVAS EAR/PLS OXIMETRY MLT: CPT

## 2019-07-31 PROCEDURE — 38571 PR LAP,PELVIC LYMPHADENECTOMY: ICD-10-PCS | Mod: 52,AS,51, | Performed by: NURSE PRACTITIONER

## 2019-07-31 PROCEDURE — 25000003 PHARM REV CODE 250

## 2019-07-31 PROCEDURE — 58571 PR LAPAROSCOPY W TOT HYSTERECTUTERUS <=250 GRAM  W TUBE/OVARY: ICD-10-PCS | Mod: 22,AS,, | Performed by: NURSE PRACTITIONER

## 2019-07-31 PROCEDURE — 63600175 PHARM REV CODE 636 W HCPCS: Performed by: STUDENT IN AN ORGANIZED HEALTH CARE EDUCATION/TRAINING PROGRAM

## 2019-07-31 PROCEDURE — 88305 TISSUE EXAM BY PATHOLOGIST: CPT | Mod: 26,,, | Performed by: PATHOLOGY

## 2019-07-31 PROCEDURE — 20600001 HC STEP DOWN PRIVATE ROOM

## 2019-07-31 PROCEDURE — 63600175 PHARM REV CODE 636 W HCPCS: Performed by: ANESTHESIOLOGY

## 2019-07-31 PROCEDURE — 25000003 PHARM REV CODE 250: Performed by: STUDENT IN AN ORGANIZED HEALTH CARE EDUCATION/TRAINING PROGRAM

## 2019-07-31 PROCEDURE — 88360 TUMOR IMMUNOHISTOCHEM/MANUAL: CPT | Mod: 26,,, | Performed by: PATHOLOGY

## 2019-07-31 PROCEDURE — 27201423 OPTIME MED/SURG SUP & DEVICES STERILE SUPPLY: Performed by: OBSTETRICS & GYNECOLOGY

## 2019-07-31 PROCEDURE — 37000008 HC ANESTHESIA 1ST 15 MINUTES: Performed by: OBSTETRICS & GYNECOLOGY

## 2019-07-31 PROCEDURE — 58571 TLH W/T/O 250 G OR LESS: CPT | Mod: 22,,, | Performed by: OBSTETRICS & GYNECOLOGY

## 2019-07-31 PROCEDURE — 88341 TISSUE SPECIMEN TO PATHOLOGY - SURGERY: ICD-10-PCS | Mod: 26,59,, | Performed by: PATHOLOGY

## 2019-07-31 PROCEDURE — 93010 EKG 12-LEAD: ICD-10-PCS | Mod: ,,, | Performed by: INTERNAL MEDICINE

## 2019-07-31 PROCEDURE — 88309 TISSUE SPECIMEN TO PATHOLOGY - SURGERY: ICD-10-PCS | Mod: 26,,, | Performed by: PATHOLOGY

## 2019-07-31 PROCEDURE — 82962 GLUCOSE BLOOD TEST: CPT | Performed by: OBSTETRICS & GYNECOLOGY

## 2019-07-31 PROCEDURE — 37000009 HC ANESTHESIA EA ADD 15 MINS: Performed by: OBSTETRICS & GYNECOLOGY

## 2019-07-31 PROCEDURE — 71000033 HC RECOVERY, INTIAL HOUR: Performed by: OBSTETRICS & GYNECOLOGY

## 2019-07-31 PROCEDURE — 88342 IMHCHEM/IMCYTCHM 1ST ANTB: CPT | Mod: 26,59,, | Performed by: PATHOLOGY

## 2019-07-31 PROCEDURE — 36000712 HC OR TIME LEV V 1ST 15 MIN: Performed by: OBSTETRICS & GYNECOLOGY

## 2019-07-31 PROCEDURE — 88360 PR  TUMOR IMMUNOHISTOCHEM/MANUAL: ICD-10-PCS | Mod: 26,,, | Performed by: PATHOLOGY

## 2019-07-31 PROCEDURE — 25000003 PHARM REV CODE 250: Performed by: OBSTETRICS & GYNECOLOGY

## 2019-07-31 PROCEDURE — 58571 PR LAPAROSCOPY W TOT HYSTERECTUTERUS <=250 GRAM  W TUBE/OVARY: ICD-10-PCS | Mod: 22,,, | Performed by: OBSTETRICS & GYNECOLOGY

## 2019-07-31 PROCEDURE — 38572 LAPAROSCOPY LYMPHADENECTOMY: CPT | Mod: 52,51,, | Performed by: OBSTETRICS & GYNECOLOGY

## 2019-07-31 PROCEDURE — 58571 TLH W/T/O 250 G OR LESS: CPT | Mod: 22,AS,, | Performed by: NURSE PRACTITIONER

## 2019-07-31 PROCEDURE — 36000713 HC OR TIME LEV V EA ADD 15 MIN: Performed by: OBSTETRICS & GYNECOLOGY

## 2019-07-31 PROCEDURE — 88341 IMHCHEM/IMCYTCHM EA ADD ANTB: CPT | Mod: 26,59,, | Performed by: PATHOLOGY

## 2019-07-31 PROCEDURE — 88307 TISSUE EXAM BY PATHOLOGIST: CPT | Performed by: PATHOLOGY

## 2019-07-31 PROCEDURE — 38571 LAPAROSCOPY LYMPHADENECTOMY: CPT | Mod: 52,AS,51, | Performed by: NURSE PRACTITIONER

## 2019-07-31 PROCEDURE — 38572 PR LAP,PELVIC LYMPHADENECTOMY/BX: ICD-10-PCS | Mod: 52,51,, | Performed by: OBSTETRICS & GYNECOLOGY

## 2019-07-31 RX ORDER — METOPROLOL TARTRATE 50 MG/1
50 TABLET ORAL 2 TIMES DAILY
Status: DISCONTINUED | OUTPATIENT
Start: 2019-07-31 | End: 2019-08-01

## 2019-07-31 RX ORDER — DOCUSATE SODIUM 100 MG/1
100 CAPSULE, LIQUID FILLED ORAL 2 TIMES DAILY
Status: DISCONTINUED | OUTPATIENT
Start: 2019-07-31 | End: 2019-08-02 | Stop reason: HOSPADM

## 2019-07-31 RX ORDER — ONDANSETRON 2 MG/ML
4 INJECTION INTRAMUSCULAR; INTRAVENOUS DAILY PRN
Status: DISCONTINUED | OUTPATIENT
Start: 2019-07-31 | End: 2019-07-31 | Stop reason: HOSPADM

## 2019-07-31 RX ORDER — ACETAMINOPHEN 500 MG
1000 TABLET ORAL ONCE
Status: COMPLETED | OUTPATIENT
Start: 2019-07-31 | End: 2019-07-31

## 2019-07-31 RX ORDER — LIDOCAINE HCL/PF 100 MG/5ML
SYRINGE (ML) INTRAVENOUS
Status: DISCONTINUED | OUTPATIENT
Start: 2019-07-31 | End: 2019-07-31

## 2019-07-31 RX ORDER — GLYCOPYRROLATE 0.2 MG/ML
INJECTION INTRAMUSCULAR; INTRAVENOUS
Status: DISCONTINUED | OUTPATIENT
Start: 2019-07-31 | End: 2019-07-31

## 2019-07-31 RX ORDER — SODIUM CHLORIDE 0.9 % (FLUSH) 0.9 %
3 SYRINGE (ML) INJECTION EVERY 8 HOURS PRN
Status: DISCONTINUED | OUTPATIENT
Start: 2019-07-31 | End: 2019-07-31 | Stop reason: HOSPADM

## 2019-07-31 RX ORDER — OXYCODONE AND ACETAMINOPHEN 5; 325 MG/1; MG/1
1 TABLET ORAL EVERY 4 HOURS PRN
Status: DISCONTINUED | OUTPATIENT
Start: 2019-07-31 | End: 2019-08-02 | Stop reason: HOSPADM

## 2019-07-31 RX ORDER — NEOSTIGMINE METHYLSULFATE 1 MG/ML
INJECTION, SOLUTION INTRAVENOUS
Status: DISCONTINUED | OUTPATIENT
Start: 2019-07-31 | End: 2019-07-31

## 2019-07-31 RX ORDER — ONDANSETRON 2 MG/ML
INJECTION INTRAMUSCULAR; INTRAVENOUS
Status: DISCONTINUED | OUTPATIENT
Start: 2019-07-31 | End: 2019-07-31

## 2019-07-31 RX ORDER — METOPROLOL TARTRATE 1 MG/ML
INJECTION, SOLUTION INTRAVENOUS
Status: DISCONTINUED | OUTPATIENT
Start: 2019-07-31 | End: 2019-07-31

## 2019-07-31 RX ORDER — HYDROMORPHONE HYDROCHLORIDE 1 MG/ML
INJECTION, SOLUTION INTRAMUSCULAR; INTRAVENOUS; SUBCUTANEOUS
Status: COMPLETED
Start: 2019-07-31 | End: 2019-07-31

## 2019-07-31 RX ORDER — SODIUM CHLORIDE 9 MG/ML
INJECTION, SOLUTION INTRAVENOUS CONTINUOUS PRN
Status: DISCONTINUED | OUTPATIENT
Start: 2019-07-31 | End: 2019-07-31

## 2019-07-31 RX ORDER — GABAPENTIN 300 MG/1
300 CAPSULE ORAL NIGHTLY
Status: DISCONTINUED | OUTPATIENT
Start: 2019-07-31 | End: 2019-08-02 | Stop reason: HOSPADM

## 2019-07-31 RX ORDER — PROPOFOL 10 MG/ML
VIAL (ML) INTRAVENOUS
Status: DISCONTINUED | OUTPATIENT
Start: 2019-07-31 | End: 2019-07-31

## 2019-07-31 RX ORDER — SODIUM CHLORIDE 9 MG/ML
INJECTION, SOLUTION INTRAVENOUS CONTINUOUS
Status: DISCONTINUED | OUTPATIENT
Start: 2019-07-31 | End: 2019-07-31

## 2019-07-31 RX ORDER — LIDOCAINE HYDROCHLORIDE 10 MG/ML
1 INJECTION, SOLUTION EPIDURAL; INFILTRATION; INTRACAUDAL; PERINEURAL ONCE
Status: COMPLETED | OUTPATIENT
Start: 2019-07-31 | End: 2019-07-31

## 2019-07-31 RX ORDER — ESMOLOL HYDROCHLORIDE 10 MG/ML
INJECTION INTRAVENOUS
Status: DISCONTINUED | OUTPATIENT
Start: 2019-07-31 | End: 2019-07-31

## 2019-07-31 RX ORDER — CEFAZOLIN SODIUM 1 G/3ML
2 INJECTION, POWDER, FOR SOLUTION INTRAMUSCULAR; INTRAVENOUS
Status: COMPLETED | OUTPATIENT
Start: 2019-07-31 | End: 2019-07-31

## 2019-07-31 RX ORDER — OXYCODONE AND ACETAMINOPHEN 10; 325 MG/1; MG/1
TABLET ORAL
Status: COMPLETED
Start: 2019-07-31 | End: 2019-07-31

## 2019-07-31 RX ORDER — HYDROMORPHONE HYDROCHLORIDE 1 MG/ML
1 INJECTION, SOLUTION INTRAMUSCULAR; INTRAVENOUS; SUBCUTANEOUS EVERY 4 HOURS PRN
Status: DISCONTINUED | OUTPATIENT
Start: 2019-07-31 | End: 2019-08-02 | Stop reason: HOSPADM

## 2019-07-31 RX ORDER — OXYCODONE AND ACETAMINOPHEN 10; 325 MG/1; MG/1
1 TABLET ORAL EVERY 4 HOURS PRN
Status: DISCONTINUED | OUTPATIENT
Start: 2019-07-31 | End: 2019-08-02 | Stop reason: HOSPADM

## 2019-07-31 RX ORDER — ROCURONIUM BROMIDE 10 MG/ML
INJECTION, SOLUTION INTRAVENOUS
Status: DISCONTINUED | OUTPATIENT
Start: 2019-07-31 | End: 2019-07-31

## 2019-07-31 RX ORDER — HYDROMORPHONE HYDROCHLORIDE 1 MG/ML
0.5 INJECTION, SOLUTION INTRAMUSCULAR; INTRAVENOUS; SUBCUTANEOUS EVERY 5 MIN PRN
Status: COMPLETED | OUTPATIENT
Start: 2019-07-31 | End: 2019-07-31

## 2019-07-31 RX ORDER — SIMETHICONE 80 MG
80 TABLET,CHEWABLE ORAL EVERY 4 HOURS PRN
Status: DISCONTINUED | OUTPATIENT
Start: 2019-07-31 | End: 2019-08-01 | Stop reason: SDUPTHER

## 2019-07-31 RX ORDER — DEXAMETHASONE SODIUM PHOSPHATE 4 MG/ML
INJECTION, SOLUTION INTRA-ARTICULAR; INTRALESIONAL; INTRAMUSCULAR; INTRAVENOUS; SOFT TISSUE
Status: DISCONTINUED | OUTPATIENT
Start: 2019-07-31 | End: 2019-07-31

## 2019-07-31 RX ORDER — MIDAZOLAM HYDROCHLORIDE 1 MG/ML
INJECTION, SOLUTION INTRAMUSCULAR; INTRAVENOUS
Status: DISCONTINUED | OUTPATIENT
Start: 2019-07-31 | End: 2019-07-31

## 2019-07-31 RX ORDER — METOPROLOL TARTRATE 1 MG/ML
5 INJECTION, SOLUTION INTRAVENOUS ONCE
Status: DISCONTINUED | OUTPATIENT
Start: 2019-08-01 | End: 2019-07-31

## 2019-07-31 RX ORDER — METOPROLOL TARTRATE 50 MG/1
50 TABLET ORAL ONCE
Status: COMPLETED | OUTPATIENT
Start: 2019-08-01 | End: 2019-07-31

## 2019-07-31 RX ORDER — FENTANYL CITRATE 50 UG/ML
INJECTION, SOLUTION INTRAMUSCULAR; INTRAVENOUS
Status: DISCONTINUED | OUTPATIENT
Start: 2019-07-31 | End: 2019-07-31

## 2019-07-31 RX ORDER — SODIUM CHLORIDE, SODIUM LACTATE, POTASSIUM CHLORIDE, CALCIUM CHLORIDE 600; 310; 30; 20 MG/100ML; MG/100ML; MG/100ML; MG/100ML
INJECTION, SOLUTION INTRAVENOUS CONTINUOUS
Status: DISCONTINUED | OUTPATIENT
Start: 2019-07-31 | End: 2019-08-02 | Stop reason: HOSPADM

## 2019-07-31 RX ORDER — DIPHENHYDRAMINE HCL 25 MG
25 CAPSULE ORAL EVERY 4 HOURS PRN
Status: DISCONTINUED | OUTPATIENT
Start: 2019-07-31 | End: 2019-08-02 | Stop reason: HOSPADM

## 2019-07-31 RX ORDER — ONDANSETRON 8 MG/1
8 TABLET, ORALLY DISINTEGRATING ORAL EVERY 8 HOURS PRN
Status: DISCONTINUED | OUTPATIENT
Start: 2019-07-31 | End: 2019-08-02 | Stop reason: HOSPADM

## 2019-07-31 RX ORDER — PHENYLEPHRINE HYDROCHLORIDE 10 MG/ML
INJECTION INTRAVENOUS
Status: DISCONTINUED | OUTPATIENT
Start: 2019-07-31 | End: 2019-07-31

## 2019-07-31 RX ADMIN — ROCURONIUM BROMIDE 20 MG: 10 INJECTION, SOLUTION INTRAVENOUS at 02:07

## 2019-07-31 RX ADMIN — SODIUM CHLORIDE, SODIUM GLUCONATE, SODIUM ACETATE, POTASSIUM CHLORIDE, MAGNESIUM CHLORIDE, SODIUM PHOSPHATE, DIBASIC, AND POTASSIUM PHOSPHATE: .53; .5; .37; .037; .03; .012; .00082 INJECTION, SOLUTION INTRAVENOUS at 12:07

## 2019-07-31 RX ADMIN — OXYCODONE HYDROCHLORIDE AND ACETAMINOPHEN 1 TABLET: 10; 325 TABLET ORAL at 09:07

## 2019-07-31 RX ADMIN — HYDROMORPHONE HYDROCHLORIDE 0.5 MG: 1 INJECTION, SOLUTION INTRAMUSCULAR; INTRAVENOUS; SUBCUTANEOUS at 06:07

## 2019-07-31 RX ADMIN — PHENYLEPHRINE HYDROCHLORIDE 200 MCG: 10 INJECTION INTRAVENOUS at 12:07

## 2019-07-31 RX ADMIN — ROCURONIUM BROMIDE 20 MG: 10 INJECTION, SOLUTION INTRAVENOUS at 01:07

## 2019-07-31 RX ADMIN — HYDROMORPHONE HYDROCHLORIDE 0.5 MG: 1 INJECTION, SOLUTION INTRAMUSCULAR; INTRAVENOUS; SUBCUTANEOUS at 05:07

## 2019-07-31 RX ADMIN — ONDANSETRON 4 MG: 2 INJECTION INTRAMUSCULAR; INTRAVENOUS at 03:07

## 2019-07-31 RX ADMIN — PHENYLEPHRINE HYDROCHLORIDE 100 MCG: 10 INJECTION INTRAVENOUS at 02:07

## 2019-07-31 RX ADMIN — GLYCOPYRROLATE 0.6 MG: 0.2 INJECTION, SOLUTION INTRAMUSCULAR; INTRAVENOUS at 04:07

## 2019-07-31 RX ADMIN — METOPROLOL TARTRATE 1 MG: 1 INJECTION, SOLUTION INTRAVENOUS at 03:07

## 2019-07-31 RX ADMIN — PROPOFOL 150 MG: 10 INJECTION, EMULSION INTRAVENOUS at 11:07

## 2019-07-31 RX ADMIN — DEXAMETHASONE SODIUM PHOSPHATE 4 MG: 4 INJECTION, SOLUTION INTRAMUSCULAR; INTRAVENOUS at 01:07

## 2019-07-31 RX ADMIN — HYDROMORPHONE HYDROCHLORIDE 0.5 MG: 1 INJECTION, SOLUTION INTRAMUSCULAR; INTRAVENOUS; SUBCUTANEOUS at 04:07

## 2019-07-31 RX ADMIN — LIDOCAINE HYDROCHLORIDE 80 MG: 20 INJECTION, SOLUTION INTRAVENOUS at 11:07

## 2019-07-31 RX ADMIN — METOPROLOL TARTRATE 50 MG: 50 TABLET ORAL at 11:07

## 2019-07-31 RX ADMIN — PHENYLEPHRINE HYDROCHLORIDE 100 MCG: 10 INJECTION INTRAVENOUS at 12:07

## 2019-07-31 RX ADMIN — SODIUM CHLORIDE: 0.9 INJECTION, SOLUTION INTRAVENOUS at 10:07

## 2019-07-31 RX ADMIN — SODIUM CHLORIDE: 0.9 INJECTION, SOLUTION INTRAVENOUS at 11:07

## 2019-07-31 RX ADMIN — METOPROLOL TARTRATE 2.5 MG: 1 INJECTION, SOLUTION INTRAVENOUS at 12:07

## 2019-07-31 RX ADMIN — PHENYLEPHRINE HYDROCHLORIDE 100 MCG: 10 INJECTION INTRAVENOUS at 01:07

## 2019-07-31 RX ADMIN — METOPROLOL TARTRATE 50 MG: 50 TABLET ORAL at 07:07

## 2019-07-31 RX ADMIN — SODIUM CHLORIDE, SODIUM LACTATE, POTASSIUM CHLORIDE, AND CALCIUM CHLORIDE: .6; .31; .03; .02 INJECTION, SOLUTION INTRAVENOUS at 04:07

## 2019-07-31 RX ADMIN — MIDAZOLAM HYDROCHLORIDE 2 MG: 1 INJECTION, SOLUTION INTRAMUSCULAR; INTRAVENOUS at 11:07

## 2019-07-31 RX ADMIN — PHENYLEPHRINE HYDROCHLORIDE 200 MCG: 10 INJECTION INTRAVENOUS at 02:07

## 2019-07-31 RX ADMIN — ESMOLOL HYDROCHLORIDE 20 MG: 10 INJECTION INTRAVENOUS at 11:07

## 2019-07-31 RX ADMIN — LIDOCAINE HYDROCHLORIDE: 10 INJECTION, SOLUTION EPIDURAL; INFILTRATION; INTRACAUDAL; PERINEURAL at 10:07

## 2019-07-31 RX ADMIN — ROCURONIUM BROMIDE 10 MG: 10 INJECTION, SOLUTION INTRAVENOUS at 02:07

## 2019-07-31 RX ADMIN — DOCUSATE SODIUM 100 MG: 100 CAPSULE, LIQUID FILLED ORAL at 09:07

## 2019-07-31 RX ADMIN — GABAPENTIN 300 MG: 300 CAPSULE ORAL at 09:07

## 2019-07-31 RX ADMIN — OXYCODONE HYDROCHLORIDE AND ACETAMINOPHEN 1 TABLET: 10; 325 TABLET ORAL at 04:07

## 2019-07-31 RX ADMIN — CEFAZOLIN 2 G: 330 INJECTION, POWDER, FOR SOLUTION INTRAMUSCULAR; INTRAVENOUS at 12:07

## 2019-07-31 RX ADMIN — ACETAMINOPHEN 1000 MG: 500 TABLET ORAL at 11:07

## 2019-07-31 RX ADMIN — ROCURONIUM BROMIDE 10 MG: 10 INJECTION, SOLUTION INTRAVENOUS at 03:07

## 2019-07-31 RX ADMIN — ROCURONIUM BROMIDE 10 MG: 10 INJECTION, SOLUTION INTRAVENOUS at 12:07

## 2019-07-31 RX ADMIN — NEOSTIGMINE METHYLSULFATE 5 MG: 1 INJECTION INTRAVENOUS at 04:07

## 2019-07-31 RX ADMIN — PHENYLEPHRINE HYDROCHLORIDE 200 MCG: 10 INJECTION INTRAVENOUS at 01:07

## 2019-07-31 RX ADMIN — ROCURONIUM BROMIDE 40 MG: 10 INJECTION, SOLUTION INTRAVENOUS at 11:07

## 2019-07-31 RX ADMIN — FENTANYL CITRATE 100 MCG: 50 INJECTION, SOLUTION INTRAMUSCULAR; INTRAVENOUS at 11:07

## 2019-07-31 RX ADMIN — SODIUM CHLORIDE 0.5 MCG/KG/MIN: 9 INJECTION, SOLUTION INTRAVENOUS at 02:07

## 2019-07-31 RX ADMIN — PHENYLEPHRINE HYDROCHLORIDE 200 MCG: 10 INJECTION INTRAVENOUS at 11:07

## 2019-07-31 RX ADMIN — ESMOLOL HYDROCHLORIDE 30 MG: 10 INJECTION INTRAVENOUS at 11:07

## 2019-07-31 NOTE — OR NURSING
Specimen to pathology:   1.) Uterus, cervix, bilateral tubes and ovaries. (permanent)  2.) Right pelvic and obturator lymph nodes ( permanent)  3.) Right kalyan aortic lymph nodes (permanent)  4.) Omentum (permanent)    Family updated via epic messenger throughout the case.

## 2019-07-31 NOTE — OPERATIVE NOTE ADDENDUM
Certification of Assistant at Surgery       Surgery Date: 7/31/2019     Participating Surgeons:  Surgeon(s) and Role:     * Tamika Quesada MD - Primary     * Jhoana Shelley MD - Resident - Assisting    Procedures:  Procedure(s) (LRB):  XI ROBOTIC HYSTERECTOMY (N/A)  XI ROBOTIC SALPINGO-OOPHORECTOMY (Bilateral)  STAGING (N/A)    Assistant Surgeon's Certification of Necessity:  I understand that section 1842 (b) (6) (d) of the Social Security Act generally prohibits Medicare Part B reasonable charge payment for the services of assistants at surgery in teaching hospitals when qualified residents are available to furnish such services. I certify that the services for which payment is claimed were medically necessary, and that no qualified resident was available to perform the services. I further understand that these services are subject to post-payment review by the Medicare carrier.      Debbie Mata NP    07/31/2019  3:49 PM

## 2019-07-31 NOTE — PROGRESS NOTES
Spoke with Dr. Liriano regarding patients current rate of A-fib 's. Per MD richey for telemetry monitoring and to administer night dose of Metoprolol PO now.

## 2019-07-31 NOTE — BRIEF OP NOTE
[facility]  Brief Op Note    Admit Date: 7/31/2019    Attending Physician: Tamika Quesada MD     Surgery Date: 7/31/2019     Surgeon(s) and Role:     * Tamika Quesada MD - Primary     * Jhoana Shelley MD - Resident - Assisting        Pre-op Diagnosis:  Papillary serous endometrial adenocarcinoma [C54.1]    Post-op Diagnosis:  Post-Op Diagnosis Codes:     * Papillary serous endometrial adenocarcinoma [C54.1]    Procedure(s) (LRB):  XI ROBOTIC HYSTERECTOMY (N/A)  XI ROBOTIC SALPINGO-OOPHORECTOMY (Bilateral)  XI ROBOTIC LYMPHADENECTOMY, PELVIC (Right)  LYMPHADENECTOMY, PARA-AORTIC (Right)  OMENTECTOMY (N/A)    Anesthesia: General    Findings/Key Components:   1. Uterus sounded to 8cm   2. Left ovary and tube, round ligament noted to be  from uterus. Adherent to left pelvic sidewall. Successfully removed  3. Routine TLH- RSO  4. Both ureters noted to be vermiculating   5. Right illiac and obturator lymph nodes dissected out   6. Left pelvic kidney noted, did not attempt left pelvic lymph node dissection due to aberrant vasculature   8. Right periaortic lymph nodes dissected   9. Mini lap created to achieve omentectomy, omentum usually thick. Successfully closed  10. All lap sites c/d/i. Hemostasis noted     Estimated Blood Loss: minimal          Specimens:   Specimen (12h ago, onward)    Start     Ordered    07/31/19 1609  Specimen to Pathology - Surgery  Once     Comments:  1.) Uterus, cervix, bilateral tubes and ovaries. (permanent)2.) Right pelvic and obturator lymph nodes ( permanent)3.) Right kalyan aortic lymph nodes (permanent)4.) Omentum (permanent)     Start Status     07/31/19 1609 Collected (07/31/19 1608) Order ID: [id number]       07/31/19 1608              Jhoana Shelley M.D.  [facility]

## 2019-07-31 NOTE — INTERVAL H&P NOTE
The patient has been examined and the H&P has been reviewed:    I concur with the findings and no changes have occurred since H&P was written.    Surgery risks, benefits and alternative options discussed and understood by patient/family.          Active Hospital Problems    Diagnosis  POA    Papillary serous endometrial adenocarcinoma [C54.1]  Yes      Resolved Hospital Problems   No resolved problems to display.

## 2019-07-31 NOTE — TRANSFER OF CARE
"Anesthesia Transfer of Care Note    Patient: Shalini Alcaraz    Procedure(s) Performed: Procedure(s) (LRB):  XI ROBOTIC HYSTERECTOMY (N/A)  XI ROBOTIC SALPINGO-OOPHORECTOMY (Bilateral)  XI ROBOTIC LYMPHADENECTOMY, PELVIC (Right)  LYMPHADENECTOMY, PARA-AORTIC (Right)  OMENTECTOMY (N/A)    Patient location: PACU    Anesthesia Type: general    Transport from OR: Transported from OR on 6-10 L/min O2 by face mask with adequate spontaneous ventilation    Post pain: adequate analgesia    Post assessment: no apparent anesthetic complications    Post vital signs: stable    Level of consciousness: awake    Nausea/Vomiting: no nausea/vomiting    Complications: none    Transfer of care protocol was followed      Last vitals:   Visit Vitals  /84 (BP Location: Right arm, Patient Position: Lying)   Pulse 97   Temp 36.1 °C (97 °F) (Temporal)   Resp 12   Ht 5' 10" (1.778 m)   Wt 90.7 kg (200 lb)   SpO2 100%   Breastfeeding? No   BMI 28.70 kg/m²     "

## 2019-08-01 PROBLEM — I48.91 ATRIAL FIBRILLATION: Status: ACTIVE | Noted: 2019-08-01

## 2019-08-01 LAB
ANION GAP SERPL CALC-SCNC: 6 MMOL/L (ref 8–16)
BASOPHILS # BLD AUTO: 0.06 K/UL (ref 0–0.2)
BASOPHILS NFR BLD: 0.3 % (ref 0–1.9)
BUN SERPL-MCNC: 18 MG/DL (ref 8–23)
CALCIUM SERPL-MCNC: 8.9 MG/DL (ref 8.7–10.5)
CHLORIDE SERPL-SCNC: 100 MMOL/L (ref 95–110)
CO2 SERPL-SCNC: 30 MMOL/L (ref 23–29)
CREAT SERPL-MCNC: 0.8 MG/DL (ref 0.5–1.4)
DIFFERENTIAL METHOD: ABNORMAL
EOSINOPHIL # BLD AUTO: 0.3 K/UL (ref 0–0.5)
EOSINOPHIL NFR BLD: 1.5 % (ref 0–8)
ERYTHROCYTE [DISTWIDTH] IN BLOOD BY AUTOMATED COUNT: 13.3 % (ref 11.5–14.5)
EST. GFR  (AFRICAN AMERICAN): >60 ML/MIN/1.73 M^2
EST. GFR  (NON AFRICAN AMERICAN): >60 ML/MIN/1.73 M^2
GLUCOSE SERPL-MCNC: 119 MG/DL (ref 70–110)
HCT VFR BLD AUTO: 38.9 % (ref 37–48.5)
HGB BLD-MCNC: 12.4 G/DL (ref 12–16)
IMM GRANULOCYTES # BLD AUTO: 0.12 K/UL (ref 0–0.04)
IMM GRANULOCYTES NFR BLD AUTO: 0.6 % (ref 0–0.5)
LYMPHOCYTES # BLD AUTO: 1.5 K/UL (ref 1–4.8)
LYMPHOCYTES NFR BLD: 7.4 % (ref 18–48)
MCH RBC QN AUTO: 29.5 PG (ref 27–31)
MCHC RBC AUTO-ENTMCNC: 31.9 G/DL (ref 32–36)
MCV RBC AUTO: 93 FL (ref 82–98)
MONOCYTES # BLD AUTO: 1.4 K/UL (ref 0.3–1)
MONOCYTES NFR BLD: 6.8 % (ref 4–15)
NEUTROPHILS # BLD AUTO: 16.8 K/UL (ref 1.8–7.7)
NEUTROPHILS NFR BLD: 83.4 % (ref 38–73)
NRBC BLD-RTO: 0 /100 WBC
PLATELET # BLD AUTO: 229 K/UL (ref 150–350)
PMV BLD AUTO: 10.3 FL (ref 9.2–12.9)
POTASSIUM SERPL-SCNC: 4.6 MMOL/L (ref 3.5–5.1)
RBC # BLD AUTO: 4.2 M/UL (ref 4–5.4)
SODIUM SERPL-SCNC: 136 MMOL/L (ref 136–145)
WBC # BLD AUTO: 20.1 K/UL (ref 3.9–12.7)

## 2019-08-01 PROCEDURE — 99024 POSTOP FOLLOW-UP VISIT: CPT | Mod: POP,,, | Performed by: OBSTETRICS & GYNECOLOGY

## 2019-08-01 PROCEDURE — 99222 1ST HOSP IP/OBS MODERATE 55: CPT | Mod: GC,,, | Performed by: INTERNAL MEDICINE

## 2019-08-01 PROCEDURE — 63600175 PHARM REV CODE 636 W HCPCS: Performed by: STUDENT IN AN ORGANIZED HEALTH CARE EDUCATION/TRAINING PROGRAM

## 2019-08-01 PROCEDURE — 25000003 PHARM REV CODE 250: Performed by: STUDENT IN AN ORGANIZED HEALTH CARE EDUCATION/TRAINING PROGRAM

## 2019-08-01 PROCEDURE — 99222 PR INITIAL HOSPITAL CARE,LEVL II: ICD-10-PCS | Mod: GC,,, | Performed by: INTERNAL MEDICINE

## 2019-08-01 PROCEDURE — 80048 BASIC METABOLIC PNL TOTAL CA: CPT

## 2019-08-01 PROCEDURE — 94799 UNLISTED PULMONARY SVC/PX: CPT

## 2019-08-01 PROCEDURE — 63600175 PHARM REV CODE 636 W HCPCS: Performed by: OBSTETRICS & GYNECOLOGY

## 2019-08-01 PROCEDURE — 85025 COMPLETE CBC W/AUTO DIFF WBC: CPT

## 2019-08-01 PROCEDURE — 99900035 HC TECH TIME PER 15 MIN (STAT)

## 2019-08-01 PROCEDURE — 99024 PR POST-OP FOLLOW-UP VISIT: ICD-10-PCS | Mod: POP,,, | Performed by: OBSTETRICS & GYNECOLOGY

## 2019-08-01 PROCEDURE — 20600001 HC STEP DOWN PRIVATE ROOM

## 2019-08-01 PROCEDURE — 36415 COLL VENOUS BLD VENIPUNCTURE: CPT

## 2019-08-01 RX ORDER — SIMETHICONE 80 MG
1 TABLET,CHEWABLE ORAL 3 TIMES DAILY PRN
Status: DISCONTINUED | OUTPATIENT
Start: 2019-08-01 | End: 2019-08-02 | Stop reason: HOSPADM

## 2019-08-01 RX ORDER — METOPROLOL TARTRATE 25 MG/1
25 TABLET, FILM COATED ORAL EVERY 6 HOURS SCHEDULED
Status: DISCONTINUED | OUTPATIENT
Start: 2019-08-01 | End: 2019-08-02 | Stop reason: HOSPADM

## 2019-08-01 RX ORDER — CALCIUM CARBONATE 200(500)MG
500 TABLET,CHEWABLE ORAL DAILY PRN
Status: DISCONTINUED | OUTPATIENT
Start: 2019-08-01 | End: 2019-08-02 | Stop reason: HOSPADM

## 2019-08-01 RX ORDER — PANTOPRAZOLE SODIUM 40 MG/1
40 TABLET, DELAYED RELEASE ORAL DAILY
Status: DISCONTINUED | OUTPATIENT
Start: 2019-08-02 | End: 2019-08-02 | Stop reason: HOSPADM

## 2019-08-01 RX ADMIN — APIXABAN 5 MG: 5 TABLET, FILM COATED ORAL at 08:08

## 2019-08-01 RX ADMIN — GABAPENTIN 300 MG: 300 CAPSULE ORAL at 08:08

## 2019-08-01 RX ADMIN — SIMETHICONE 80 MG: 80 TABLET, CHEWABLE ORAL at 05:08

## 2019-08-01 RX ADMIN — OXYCODONE HYDROCHLORIDE AND ACETAMINOPHEN 1 TABLET: 10; 325 TABLET ORAL at 01:08

## 2019-08-01 RX ADMIN — CALCIUM CARBONATE (ANTACID) CHEW TAB 500 MG 500 MG: 500 CHEW TAB at 05:08

## 2019-08-01 RX ADMIN — DOCUSATE SODIUM 100 MG: 100 CAPSULE, LIQUID FILLED ORAL at 08:08

## 2019-08-01 RX ADMIN — SODIUM CHLORIDE, SODIUM LACTATE, POTASSIUM CHLORIDE, AND CALCIUM CHLORIDE 500 ML: .6; .31; .03; .02 INJECTION, SOLUTION INTRAVENOUS at 08:08

## 2019-08-01 RX ADMIN — OXYCODONE HYDROCHLORIDE AND ACETAMINOPHEN 1 TABLET: 10; 325 TABLET ORAL at 06:08

## 2019-08-01 RX ADMIN — METOPROLOL TARTRATE 50 MG: 50 TABLET ORAL at 08:08

## 2019-08-01 RX ADMIN — OXYCODONE HYDROCHLORIDE AND ACETAMINOPHEN 1 TABLET: 10; 325 TABLET ORAL at 12:08

## 2019-08-01 RX ADMIN — SIMETHICONE CHEW TAB 80 MG 80 MG: 80 TABLET ORAL at 11:08

## 2019-08-01 RX ADMIN — SODIUM CHLORIDE, SODIUM LACTATE, POTASSIUM CHLORIDE, AND CALCIUM CHLORIDE: .6; .31; .03; .02 INJECTION, SOLUTION INTRAVENOUS at 01:08

## 2019-08-01 RX ADMIN — METOPROLOL TARTRATE 25 MG: 25 TABLET ORAL at 03:08

## 2019-08-01 RX ADMIN — OXYCODONE HYDROCHLORIDE AND ACETAMINOPHEN 1 TABLET: 10; 325 TABLET ORAL at 07:08

## 2019-08-01 RX ADMIN — METOPROLOL TARTRATE 25 MG: 25 TABLET ORAL at 11:08

## 2019-08-01 RX ADMIN — APIXABAN 5 MG: 5 TABLET, FILM COATED ORAL at 12:08

## 2019-08-01 RX ADMIN — CALCIUM CARBONATE (ANTACID) CHEW TAB 500 MG 500 MG: 500 CHEW TAB at 11:08

## 2019-08-01 NOTE — ANESTHESIA POSTPROCEDURE EVALUATION
Anesthesia Post Evaluation    Patient: Shalini Alcaraz    Procedure(s) Performed: Procedure(s) (LRB):  XI ROBOTIC HYSTERECTOMY (N/A)  XI ROBOTIC SALPINGO-OOPHORECTOMY (Bilateral)  XI ROBOTIC LYMPHADENECTOMY, PELVIC (Right)  LYMPHADENECTOMY, PARA-AORTIC (Right)  OMENTECTOMY (N/A)    Final Anesthesia Type: general  Patient location during evaluation: PACU  Patient participation: Yes- Able to Participate  Level of consciousness: awake and alert  Post-procedure vital signs: reviewed and stable  Pain management: adequate  Airway patency: patent  PONV status at discharge: No PONV  Anesthetic complications: no      Cardiovascular status: blood pressure returned to baseline  Respiratory status: unassisted, room air and spontaneous ventilation  Hydration status: euvolemic  Follow-up not needed.          Vitals Value Taken Time   /55 8/1/2019  7:48 AM   Temp 37.1 °C (98.8 °F) 8/1/2019  7:42 AM   Pulse 136 8/1/2019  7:48 AM   Resp 18 8/1/2019  7:42 AM   SpO2 94 % 8/1/2019  7:48 AM         Event Time     Out of Recovery 19:52:23          Pain/Skyler Score: Pain Rating Prior to Med Admin: 6 (8/1/2019  6:12 AM)  Pain Rating Post Med Admin: 4 (7/31/2019 10:17 PM)  Skyler Score: 10 (7/31/2019  5:45 PM)

## 2019-08-01 NOTE — HPI
79 y/o F with paroxysmal atrial fibrillation on Eliquis and metoprolol at home, mitral valve replacement, HTN. She underwent TLH/BSO for endometrial cancer. Postop she developed atrial fibrillation with RVR, HR up to 120-130s. She is asymptomatic. Denies palpitations, chest pain or SOB. Off Eliquis for about 6 weeks now due to vaginal bleeding.

## 2019-08-01 NOTE — PLAN OF CARE
Problem: Adult Inpatient Plan of Care  Goal: Plan of Care Review  Outcome: Ongoing (interventions implemented as appropriate)  POC reviewed with pt. RA sats at 96%. AAOX4. Remains free of falls and injury.     Lap sites to abd. intact with steri strips with minor drainage noted to site. LH 18g clean, dry, intact infusing LR @ 125. Pain controlled with PO PRN medications per MAR. Pt on regular diet - tolerating well - denies nausea at this time    Telemetry being monitored running sustained sinus tachycardia in 130s - Dr. Quesada aware. Pt asymptomatic with no signs of distress noted. TEDs in place. Up to toilet putting out yellow urine. Bed in lowest position, call light within reach, frequent rounds made for safety.     WCTM.

## 2019-08-01 NOTE — PROGRESS NOTES
POC reviewed with Patient and spouse. AOX4, VSS with sinus tach continuing in the 120s-130s. Rapid rounded on patient due to heart rate changing mews. On-call MD rounded on patient to check heart rate and patient status due to EKG results. Extra dose of lopressor was ordered and administered, with little to no effect.   Patient denies any symptoms of A-Fib. No physical signs of distress. IV CDI, infusing LR @ 125. 5 lap sites had some serosanguinous drainage, notified MD, and orders to reinforce with gauze given per telephone read back; applied.   Patient reports pain and tenderness around lap sites, managed with PRN medication per MAR. UOP adequate, yellow. Frequently monitored patient qhr. Call light in reach, bed in low position. WCTM

## 2019-08-01 NOTE — NURSING
BP not able to be taken via dynamap in bilateral upper extremities according to PCTDhara. Bedside RNJuhi attempted to obtain manual blood pressure in bilat UE - only able to get 75/45. Radial and brachial pulses palpable +1, HR in 130s. Obtained left leg pressure of 148/94. Pt asymptomatic and did not seem in distress. Team paged - on call Dr. Shirley said to continue to monitor pt BP and HR. Shortly afterward, pt moved from chair to bed for lunch. Other floor RN obtained manual pressure of 90/60 about 15 minutes later after patient getting up and eating. RR nurse paged to come do assessment. Pt stable with /60 and HR of 108. Pt might benefit from orthostatic blood pressures. WCTM.

## 2019-08-01 NOTE — PROGRESS NOTES
Progress Note  Gynecology    Admit Date: 7/31/2019  LOS: 1    Reason for Admission:  S/P total abdominal hysterectomy    SUBJECTIVE:     Shalini Alcaraz is a 78 y.o. No obstetric history on file. who is POD#1 s/p RA-TLH/BSO for the treatment of endometrial carcinoma.    Pt doing well this morning. Pain is well controlled. She is tolerating a regular diet without N/V. Ambulating without difficulty. Voiding without difficulty via bustamante which is to be removed this am. Not yet passing flatus. She is not yet having bowel movements. Patient with elevated heart rate overnight. She was cycling between high 90s and up to 130. She denies any palpitations or discomfort. States she can usually feel when she is in a.fib and is not experiencing her usual symptoms. She feels well and without complaint. Other vitals remained stable throughout the night. Additional dose of lopressor given which did not resolve tachycardia.     OBJECTIVE:     Vital Signs   Temp:  [97 °F (36.1 °C)-98.3 °F (36.8 °C)] 97.9 °F (36.6 °C)  Pulse:  [] 130  Resp:  [10-22] 18  SpO2:  [92 %-100 %] 92 %  BP: ()/() 106/71      Intake/Output Summary (Last 24 hours) at 8/1/2019 0646  Last data filed at 8/1/2019 0600  Gross per 24 hour   Intake 3715 ml   Output 850 ml   Net 2865 ml       Physical Exam:  Gen: A&Ox3, NAD  CV: RRR  Pulm: LCTAB, normal respiratory effort  Abd: active bowel sounds, soft, non-distended, non-tender to palpation without rebound or guarding  Inc: port sites with minimal drainage; additional gauze and tape placed by nursing overnight   Ext: PPP, no peripheral edema, TEDs/SCDs in place  : Bustamante in place draining clear yellow urine     Laboratory:  Recent Results (from the past 24 hour(s))   POCT glucose    Collection Time: 07/31/19 10:55 AM   Result Value Ref Range    POCT Glucose 109 70 - 110 mg/dL       Diagnostic Results:  n/a    ASSESSMENT/PLAN:     Active Hospital Problems    Diagnosis  POA    *s/p RA-TLH/BSO/Right  PLND/omentectomy [Z90.710]  Unknown    Papillary serous endometrial adenocarcinoma [C54.1]  Yes      Resolved Hospital Problems   No resolved problems to display.       Assessment: 78 y.o. female POD#1 s/p RA-TLH/BSO    Plan:   1. Post-op   - Routine post-op advances  - Continue PRN pain medications  - D/C bustamante and perform spontaneous voiding trial  - Encourage ambulation   - Encourage IS  - CBC stable at 12.4/39   - UOP adequate   - Continue IVF until tolerating regular diet; likely ok to dc this am  - Antiemetics prn nausea/vomiting.    2. History of a.fib   - patient with longstanding history of atrial fibrillation; normally on eliquis which was discontinued when she began having PMB  - on metoprolol 50 BID for rate control   - Pulse:  [] 130  - BP: ()/() 106/71  - persistently tachycardic overnight ranging from ; never stayed prolonged > 130  - on telemetry   - additional dose of metoprolol given at 0000 without significant resolution   - EKG read atrial flutter   - patient asymptomatic and overall feeling well  - consider restarting eliquis today    - given overall stability consider inpatient consult to cardiology versus close outpatient follow up     3. Hypertension   - on lopressor   - BP: ()/() 106/71  - stable continue to monitor       Dispo: As patient meets appropriate post-op milestones, plan for discharge to home POD#1-2.    iMni Liriano MD  OBGYN, PGY-2

## 2019-08-01 NOTE — NURSING
Received patient from PACU AOX4,VSS except HR is slightly tachycardic, notified MD, orders received to monitor if goes above 130 to call.

## 2019-08-01 NOTE — NURSING TRANSFER
Nursing Transfer Note      7/31/2019     Transfer to: 1044    Transfer via: Bed    Transfer with: IV Pump; Cardiac Monitor in place    Transported by: PCT x2    Medicines sent: N/A    Chart send with patient: Yes    Notified: spouse, son; Report called to VJ Yang    Patient reassessed at: 1930

## 2019-08-01 NOTE — SUBJECTIVE & OBJECTIVE
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    PONV (postoperative nausea and vomiting)     Post-menopausal bleeding     Spinal stenosis        Past Surgical History:   Procedure Laterality Date    AORTIC VALVE REPLACEMENT  05/19/2017    APPENDECTOMY      CHOLECYSTECTOMY      COLONOSCOPY W/ BIOPSIES 2017    KNEE SURGERY      LYMPHADENECTOMY, PARA-AORTIC Right 7/31/2019    [facility]    neural ablation      OMENTECTOMY N/A 7/31/2019    [facility]    XI ROBOTIC HYSTERECTOMY N/A 7/31/2019    [facility]    XI ROBOTIC LYMPHADENECTOMY, PELVIC Right 7/31/2019    [facility]    XI ROBOTIC SALPINGO-OOPHORECTOMY Bilateral 7/31/2019    Per[facility] by [facility] at [facility] OR [address] [place] [address]       Review of patient's allergies indicates:   Allergen Reactions    Nsaids (non-steroidal anti-inflammatory drug)      Causes diarrhea    Pentothal [thiopental sodium]      Nausea & vomitting       No current facility-administered medications on file prior to encounter.      Current Outpatient Medications on File Prior to Encounter   Medication Sig    aspirin (ECOTRIN) 81 MG EC tablet Take 81 mg by mouth once daily.    docusate sodium (COLACE) 100 MG capsule Take 100 mg by mouth daily as needed for Constipation.    ELIQUIS 5 mg Tab Take 5 mg by mouth 2 (two) times daily.    gabapentin (NEURONTIN) 300 MG capsule Take 300 mg by mouth  every evening.     glucosamine/chondr nickerson A sod (OSTEO BI-FLEX ORAL) Take by mouth once daily.    HYDROcodone-acetaminophen (NORCO)  mg per tablet TAKE 1 TABLET BY MOUTH EVERY 6 HOURS May cause drowsiness no alcohol    metoprolol tartrate (LOPRESSOR) 50 MG tablet Take 50 mg by mouth 2 (two) times daily.     Family History     Problem Relation (Age of Onset)    Hypertension Mother    Lupus Sister        Tobacco Use    Smoking status: Former Smoker     Last attempt to quit: 2018     Years since quittin.5   Substance and Sexual Activity    Alcohol use: Yes     Alcohol/week: 0.6 - 1.2 oz     Types: 1 - 2 Glasses of wine per week    Drug use: Never    Sexual activity: Not on file     Review of Systems   Constitution: Negative for chills and fever.   Cardiovascular: Negative for chest pain, dyspnea on exertion, palpitations and syncope.   Respiratory: Negative for cough and sleep disturbances due to breathing.    Musculoskeletal: Negative for back pain.   Gastrointestinal: Negative for abdominal pain, nausea and vomiting.   Neurological: Negative for dizziness and focal weakness.   Psychiatric/Behavioral: Negative for altered mental status.     Objective:     Vital Signs (Most Recent):  Temp: 98.8 °F (37.1 °C) (19)  Pulse: (!) 136 (19)  Resp: 18 (19)  BP: (!) 111/55 (19)  SpO2: (!) 94 % (19) Vital Signs (24h Range):  Temp:  [97 °F (36.1 °C)-98.8 °F (37.1 °C)] 98.8 °F (37.1 °C)  Pulse:  [] 136  Resp:  [10-22] 18  SpO2:  [92 %-100 %] 94 %  BP: ()/() 111/55     Weight: 90.7 kg (199 lb 15.3 oz)  Body mass index is 28.69 kg/m².    SpO2: (!) 94 %  O2 Device (Oxygen Therapy): room air      Intake/Output Summary (Last 24 hours) at 2019 1203  Last data filed at 2019 0600  Gross per 24 hour   Intake 2715 ml   Output 850 ml   Net 1865 ml       Lines/Drains/Airways     Peripheral Intravenous Line                 Peripheral IV -  Single Lumen 07/31/19 1055 18 G Left Hand 1 day                Physical Exam   Constitutional: She is oriented to person, place, and time. She appears well-developed and well-nourished. No distress.   HENT:   Head: Normocephalic and atraumatic.   Eyes: Conjunctivae and EOM are normal.   Neck: Normal range of motion. No tracheal deviation present.   Cardiovascular: Normal rate, regular rhythm and normal heart sounds.   No murmur heard.  Irregularly irregular rhythm   Pulmonary/Chest: Effort normal and breath sounds normal. No respiratory distress. She has no wheezes.   Abdominal: Soft. Bowel sounds are normal. She exhibits no distension. There is no tenderness.   Musculoskeletal: Normal range of motion. She exhibits no edema.   Neurological: She is alert and oriented to person, place, and time.   Skin: She is not diaphoretic.       Significant Labs: All pertinent lab results from the last 24 hours have been reviewed.    Significant Imaging: All pertinent images from the last 24h have been reviewed.

## 2019-08-01 NOTE — CARE UPDATE
Rapid Response Respiratory Chart Check     Chart check completed,rapid nurse spoke with bedside RN,  contacted, no concerns verbalized at this time, instructed to call [phone] for further concerns or assistance.

## 2019-08-01 NOTE — CONSULTS
[facility]  Cardiology  Consult Note    Patient Name: Shalini Alcaraz  MRN: [id number]  Admission Date: 7/31/2019  Hospital Length of Stay: 1 days  Code Status: Prior   Attending Provider: Tamika Quesada MD   Consulting Provider: Fernandez Paige MD  Primary Care Physician: Vicenta López MD  Principal Problem:S/P total abdominal hysterectomy    Patient information was obtained from patient, past medical records and ER records.     Inpatient consult to Cardiology  Consult performed by: Fernandez Paige MD  Consult ordered by: Mini Liriano MD        Subjective:     Chief Complaint:  afib RVR     HPI:   79 y/o F with paroxysmal atrial fibrillation on Eliquis and metoprolol at home, mitral valve replacement, HTN. She underwent TLH/BSO for endometrial cancer. Postop she developed atrial fibrillation with RVR, HR up to 120-130s. She is asymptomatic. Denies palpitations, chest pain or SOB. Off Eliquis for about 6 weeks now due to vaginal bleeding.      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    PONV (postoperative nausea and vomiting)     Post-menopausal bleeding     Spinal stenosis        Past Surgical History:   Procedure Laterality Date    AORTIC VALVE REPLACEMENT  05/19/2017    APPENDECTOMY      CHOLECYSTECTOMY      COLONOSCOPY W/ BIOPSIES  2017    KNEE SURGERY      LYMPHADENECTOMY, PARA-AORTIC Right 7/31/2019    [facility] OR [address] [place] [address]    neural ablation      OMENTECTOMY N/A 7/31/2019    [facility]  at [facility]    XI ROBOTIC HYSTERECTOMY N/A 2019    [facility]    XI ROBOTIC LYMPHADENECTOMY, PELVIC Right 2019    [facility]    XI ROBOTIC SALPINGO-OOPHORECTOMY Bilateral 2019    Performed by [facility] at [facility]       Review of patient's allergies indicates:   Allergen Reactions    Nsaids (non-steroidal anti-inflammatory drug)      Causes diarrhea    Pentothal [thiopental sodium]      Nausea & vomitting       No current facility-administered medications on file prior to encounter.      Current Outpatient Medications on File Prior to Encounter   Medication Sig    aspirin (ECOTRIN) 81 MG EC tablet Take 81 mg by mouth once daily.    docusate sodium (COLACE) 100 MG capsule Take 100 mg by mouth daily as needed for Constipation.    ELIQUIS 5 mg Tab Take 5 mg by mouth 2 (two) times daily.    gabapentin (NEURONTIN) 300 MG capsule Take 300 mg by mouth every evening.     glucosamine/chondr nickerson A sod (OSTEO BI-FLEX ORAL) Take by mouth once daily.    HYDROcodone-acetaminophen (NORCO)  mg per tablet TAKE 1 TABLET BY MOUTH EVERY 6 HOURS May cause drowsiness no alcohol    metoprolol tartrate (LOPRESSOR) 50 MG tablet Take 50 mg by mouth 2 (two) times daily.     Family History     Problem Relation (Age of Onset)    Hypertension Mother    Lupus Sister        Tobacco Use    Smoking status: Former Smoker     Last attempt to quit: 2018     Years since quittin.5   Substance and Sexual Activity    Alcohol use: Yes     Alcohol/week: 0.6 - 1.2 oz     Types: 1 - 2 Glasses of wine per week    Drug use: Never    Sexual activity: Not on file     Review of Systems   Constitution: Negative for chills and fever.   Cardiovascular: Negative for chest pain, dyspnea on exertion, palpitations and syncope.   Respiratory: Negative for cough and sleep disturbances due to breathing.    Musculoskeletal:  Negative for back pain.   Gastrointestinal: Negative for abdominal pain, nausea and vomiting.   Neurological: Negative for dizziness and focal weakness.   Psychiatric/Behavioral: Negative for altered mental status.     Objective:     Vital Signs (Most Recent):  Temp: 98.8 °F (37.1 °C) (08/01/19 0742)  Pulse: (!) 136 (08/01/19 0748)  Resp: 18 (08/01/19 0742)  BP: (!) 111/55 (08/01/19 0748)  SpO2: (!) 94 % (08/01/19 0748) Vital Signs (24h Range):  Temp:  [97 °F (36.1 °C)-98.8 °F (37.1 °C)] 98.8 °F (37.1 °C)  Pulse:  [] 136  Resp:  [10-22] 18  SpO2:  [92 %-100 %] 94 %  BP: ()/() 111/55     Weight: 90.7 kg (199 lb 15.3 oz)  Body mass index is 28.69 kg/m².    SpO2: (!) 94 %  O2 Device (Oxygen Therapy): room air      Intake/Output Summary (Last 24 hours) at 8/1/2019 1203  Last data filed at 8/1/2019 0600  Gross per 24 hour   Intake 2715 ml   Output 850 ml   Net 1865 ml       Lines/Drains/Airways     Peripheral Intravenous Line                 Peripheral IV - Single Lumen 07/31/19 1055 18 G Left Hand 1 day                Physical Exam   Constitutional: She is oriented to person, place, and time. She appears well-developed and well-nourished. No distress.   HENT:   Head: Normocephalic and atraumatic.   Eyes: Conjunctivae and EOM are normal.   Neck: Normal range of motion. No tracheal deviation present.   Cardiovascular: Normal rate, regular rhythm and normal heart sounds.   No murmur heard.  Irregularly irregular rhythm   Pulmonary/Chest: Effort normal and breath sounds normal. No respiratory distress. She has no wheezes.   Abdominal: Soft. Bowel sounds are normal. She exhibits no distension. There is no tenderness.   Musculoskeletal: Normal range of motion. She exhibits no edema.   Neurological: She is alert and oriented to person, place, and time.   Skin: She is not diaphoretic.       Significant Labs: All pertinent lab results from the last 24 hours have been reviewed.    Significant Imaging: All  pertinent images from the last 24h have been reviewed.    Assessment and Plan:     Atrial fibrillation  - Patient is asymptomatic for afib RVR. Recommend up titrating beta blocker as tolerated. Can start with 25 mg q6h for now and increase as BP tolerates  - Resume Eliquis as soon as safe per primary team  - Follow-up with outpatient cardiologist        VTE Risk Mitigation (From admission, onward)        Ordered     apixaban tablet 5 mg  2 times daily      08/01/19 1206     IP VTE HIGH RISK PATIENT  Once      07/31/19 1634     Place sequential compression device  Until discontinued      07/31/19 1634          Thank you for your consult.     Fernandez Paige MD  Cardiology   [facility]

## 2019-08-01 NOTE — CARE UPDATE
"RAPID RESPONSE NURSE PROACTIVE ROUNDING NOTE     Time of Visit: 0000    Admit Date: 2019  LOS: 1  Code Status: Prior   Date of Visit: 2019  : 1940  Age: 78 y.o.  Sex: female  Race: White  Bed: 1044/1044 A:   MRN: [id number]  Was the patient discharged from an ICU this admission? no   Was the patient discharged from a PACU within last 24 hours?  yes  Did the patient receive conscious sedation/general anesthesia in last 24 hours?  yes  Was the patient in the ED within the past 24 hours?  no  Was the patient started on NIPPV within the past 24 hours?  no  Attending Physician: Tamika Quesada MD  Primary Service: Networked reference to record PCT     ASSESSMENT     Diagnosis: S/P total abdominal hysterectomy    Abnormal Vital Signs: /79   Pulse (!) 132   Temp 98.2 °F (36.8 °C)   Resp 18   Ht 5' 10" (1.778 m)   Wt 90.7 kg (199 lb 15.3 oz)   SpO2 (!) 92%   Breastfeeding? No   BMI 28.69 kg/m²      Clinical Issues: Circulatory    Patient  has a past medical history of Anticoagulant long-term use, Anxiety, Atrial fibrillation, Avulsion fracture of left ankle, Collagenous colitis, Degenerative joint disease of spine, Hypertension, Low back pain, Lumbar facet arthropathy, Lumbar herniated disc, Lumbar radiculopathy, Lumbar spondylosis, Neuroforaminal stenosis of lumbar spine, Osteoarthritis, Papillary serous endometrial adenocarcinoma, PONV (postoperative nausea and vomiting), Post-menopausal bleeding, and Spinal stenosis.    MEWS triggered due to elevated HR noted to be 133. S/p OLE BSO/Staging/Omenectomy. EKG showed A.Flutter, primary team aware and additional dose of lopressor administered per MD order. Patient relates being asymptomatic at this time. Receiving MIVF with adequate UOP.     INTERVENTIONS/ RECOMMENDATIONS     Continuous telemetry in place.    Discussed plan of care with Radha ZABALA.    PHYSICIAN ESCALATION     Yes/No  no    Orders received and case discussed with " NA.    Disposition: Remain in room 1044.    FOLLOW-UP     Call back the Rapid Response Nurse, Koki Sorenson RN at [phone] for additional questions or concerns.

## 2019-08-01 NOTE — ASSESSMENT & PLAN NOTE
- Patient is asymptomatic for afib RVR. Recommend up titrating beta blocker as tolerated. Can start with 25 mg q6h for now and increase as BP tolerates  - Resume Eliquis as soon as safe per primary team  - Follow-up with outpatient cardiologist

## 2019-08-01 NOTE — PLAN OF CARE
Pt AAOx4. Complaints of mild pain currently tolerable. Incision sites x5 remain intact with old drainage; steri strips intact. Guillen in place draining clear tahmina urine; IVF infusing as ordered. VSS. Tolerating water without complaints of nausea. Administered PO Metoprolol 50 mg per MD orders. Safety maintained.

## 2019-08-01 NOTE — PLAN OF CARE
"POD#1 s/p RA-TLH/BSO for the treatment of endometrial carcinoma. Patient not stable for discharge. Patient has a history of A.fib. Heart rate elevated overnight and this morning. HR= 115-135, BP 84/55. O2 sats WNL on room air. IVFs: lactated ringers infusion at 125 mL/hr continuous. EKG completed at 2315 on 7/31/19: "Atrial flutter with 2:1 A-V conduction". MD consulted Cardiology; recs pending. Apixaban restarted. IVF bolus and continuous IVFs ordered. WBC 20.10 today; all other labs stable. No discharge needs identified. CM discussed the patient with the . CM to continue to follow with the team.    Future Appointments   Date Time Provider Department Center   8/20/2019 11:15 AM Tamika Quesada MD [facility]     Marcella Molina, RN, BSN, CM  [facility]  Nurse - Med Onc/Gyn Onc    "

## 2019-08-01 NOTE — CARE UPDATE
"RAPID RESPONSE NURSE PROACTIVE ROUNDING NOTE     Time of Visit: 1316    Admit Date: 2019  LOS: 1  Code Status: Prior   Date of Visit: 2019  : 1940  Age: 78 y.o.  Sex: female  Race: White  Bed: 1044/1044 A:   MRN: [id number]  Was the patient discharged from an ICU this admission? no   Was the patient discharged from a PACU within last 24 hours?  yes  Did the patient receive conscious sedation/general anesthesia in last 24 hours?  yes  Was the patient in the ED within the past 24 hours?  no  Was the patient started on NIPPV within the past 24 hours?  no  Attending Physician: Tamika Quesada MD  Primary Service: Networked reference to record PCT     ASSESSMENT     Diagnosis: S/P total abdominal hysterectomy    Abnormal Vital Signs: /60 (BP Location: Right arm, Patient Position: Sitting)   Pulse (!) 131   Temp 98.2 °F (36.8 °C) (Oral)   Resp 17   Ht 5' 10" (1.778 m)   Wt 90.7 kg (199 lb 15.3 oz)   SpO2 (!) 92%   Breastfeeding? No   BMI 28.69 kg/m²      Clinical Issues: Circulatory    Patient  has a past medical history of Anticoagulant long-term use, Anxiety, Atrial fibrillation, Avulsion fracture of left ankle, Collagenous colitis, Degenerative joint disease of spine, Hypertension, Low back pain, Lumbar facet arthropathy, Lumbar herniated disc, Lumbar radiculopathy, Lumbar spondylosis, Neuroforaminal stenosis of lumbar spine, Osteoarthritis, Papillary serous endometrial adenocarcinoma, PONV (postoperative nausea and vomiting), Post-menopausal bleeding, and Spinal stenosis.    Called to bedside by nurse to assess pt as unable to get BP through manual. Per right leg, BP reading 140s SBP. Manual by bedside Juhi ZABALA, 70s SBP. Upon seeing pt, pt eating dinner in bed in NAD with no complaints. BP while taken in room, 110/60.  at time of visit. Pt will restart her home metoprolol today 25 mg.     INTERVENTIONS/ RECOMMENDATIONS     Monitor HR and BP closely    Discussed plan of care with " RNJuhi.    PHYSICIAN ESCALATION     Yes/No  yes    Orders received and case discussed with Dr. Shelley.    Disposition: Remain in room 1044A.    FOLLOW-UP     Call back the Rapid Response Nurse, Eduar Jaramillo RN at [phone] for additional questions or concerns.

## 2019-08-01 NOTE — CARE UPDATE
"Notified by RN of abnormally low BP of 74/45 with patient asymptomatic. MD to bedside. Upon entering room. RR RNs present. Patient comfortable, sitting in bed, eating lunch in no obvious distress. Denies CP/SOB. Denies feeling like she is in "afib". Repeat BP by RR /60, pulse 108. Patient asymptomatic. Discussed cardiology recommendations with patient and RR RN. Will continue to monitor.    Jhoana Shelley M.D.  Obstetrics and Gynecology   PGY-3    "

## 2019-08-02 VITALS
RESPIRATION RATE: 20 BRPM | TEMPERATURE: 97 F | SYSTOLIC BLOOD PRESSURE: 110 MMHG | HEIGHT: 70 IN | BODY MASS INDEX: 28.62 KG/M2 | DIASTOLIC BLOOD PRESSURE: 68 MMHG | HEART RATE: 126 BPM | OXYGEN SATURATION: 86 % | WEIGHT: 199.94 LBS

## 2019-08-02 PROCEDURE — 99231 PR SUBSEQUENT HOSPITAL CARE,LEVL I: ICD-10-PCS | Mod: ,,, | Performed by: INTERNAL MEDICINE

## 2019-08-02 PROCEDURE — 99231 SBSQ HOSP IP/OBS SF/LOW 25: CPT | Mod: ,,, | Performed by: INTERNAL MEDICINE

## 2019-08-02 PROCEDURE — 25000003 PHARM REV CODE 250: Performed by: STUDENT IN AN ORGANIZED HEALTH CARE EDUCATION/TRAINING PROGRAM

## 2019-08-02 RX ORDER — OXYCODONE AND ACETAMINOPHEN 5; 325 MG/1; MG/1
1 TABLET ORAL EVERY 4 HOURS PRN
Qty: 20 TABLET | Refills: 0 | Status: SHIPPED | OUTPATIENT
Start: 2019-08-02

## 2019-08-02 RX ORDER — METOPROLOL TARTRATE 50 MG/1
25 TABLET ORAL EVERY 6 HOURS
Qty: 60 TABLET | Refills: 0 | Status: SHIPPED | OUTPATIENT
Start: 2019-08-02 | End: 2023-08-30

## 2019-08-02 RX ADMIN — APIXABAN 5 MG: 5 TABLET, FILM COATED ORAL at 08:08

## 2019-08-02 RX ADMIN — PANTOPRAZOLE SODIUM 40 MG: 40 TABLET, DELAYED RELEASE ORAL at 08:08

## 2019-08-02 RX ADMIN — ONDANSETRON 8 MG: 8 TABLET, ORALLY DISINTEGRATING ORAL at 06:08

## 2019-08-02 RX ADMIN — OXYCODONE HYDROCHLORIDE AND ACETAMINOPHEN 1 TABLET: 5; 325 TABLET ORAL at 01:08

## 2019-08-02 RX ADMIN — METOPROLOL TARTRATE 25 MG: 25 TABLET ORAL at 11:08

## 2019-08-02 RX ADMIN — DOCUSATE SODIUM 100 MG: 100 CAPSULE, LIQUID FILLED ORAL at 08:08

## 2019-08-02 RX ADMIN — METOPROLOL TARTRATE 25 MG: 25 TABLET ORAL at 05:08

## 2019-08-02 RX ADMIN — OXYCODONE HYDROCHLORIDE AND ACETAMINOPHEN 1 TABLET: 10; 325 TABLET ORAL at 05:08

## 2019-08-02 RX ADMIN — SIMETHICONE CHEW TAB 80 MG 80 MG: 80 TABLET ORAL at 06:08

## 2019-08-02 NOTE — ASSESSMENT & PLAN NOTE
- HR better controlled overnight. Continue to uptitrate beta blocker as tolerated. She has an outpatient cardiologist with whom she has followed for >10 years. She will call to arrange an appointment.  - Patient has afib and history of mitral valve replacement. Ideally she should be on warfarin. Discussed with outpatient cardiologist Dr. Carlos Newell - he is aware and prefers to keep her on Eliquis for now. She will follow-up with him soon after discharge.

## 2019-08-02 NOTE — CARE UPDATE
Rapid Response Nurse Follow-up Note     Followed up with patient for proactive rounding.   No acute issues at this time. Reviewed plan of care with primary RN, Page [phone].   Please call Rapid Response RN, Viki Knight RN with any questions or concerns at [phone].

## 2019-08-02 NOTE — DISCHARGE SUMMARY
[facility]   Gynecology  Discharge Summary    Patient Name: Shalini Alcaraz  MRN: [id number]  Admission Date: 7/31/2019  Hospital Length of Stay: 2 days  Discharge Date and Time:  08/02/2019 6:16 AM  Attending Physician: Tamika Quesada MD   Discharging Provider: Mini Liriano MD  Primary Care Provider: Vicenta López MD      Hospital Course:   Patient presented for scheduled procedure. Patient was passed back to OR for below procedure. Please see OP note for further details. Tolerated procedure well and patient was taken to recovery in a stable condition. Prior to discharge patient was able to void, ambulate, tolerate PO and pain was well controlled with PO meds. Patient was given routine post-op instructions for which patient voiced understanding. Patient was subsequently discharged home.      Procedure(s) (LRB):  XI ROBOTIC HYSTERECTOMY (N/A)  XI ROBOTIC SALPINGO-OOPHORECTOMY (Bilateral)  XI ROBOTIC LYMPHADENECTOMY, PELVIC (Right)  LYMPHADENECTOMY, PARA-AORTIC (Right)  OMENTECTOMY (N/A)     Consults:   Consults (From admission, onward)        Status Ordering Provider     Inpatient consult to Cardiology  Once     Provider:  (Not yet assigned)    MINI Camacho          Significant Diagnostic Studies: Labs:   CBC   Recent Labs   Lab 08/01/19  0551   WBC 20.10*   HGB 12.4   HCT 38.9          Pending Diagnostic Studies:     None        Final Active Diagnoses:    Diagnosis Date Noted POA    PRINCIPAL PROBLEM:  s/p RA-TLH/BSO/Right PLND/omentectomy [Z90.710] 07/31/2019 No    Atrial fibrillation [I48.91] 08/01/2019 Yes    Papillary serous endometrial adenocarcinoma [C54.1] 07/06/2019 Yes      Problems Resolved During this Admission:        Discharged Condition: good    Disposition:     Follow Up:  Follow-up Information     Tamika Quesada MD.    Specialty:  Gynecologic Oncology  Why:  Follow-Up Appointment as scheduled by the clinic  Contact information:  [phone]  [facility] [address]  [phone]                 Patient Instructions:      Other restrictions (specify):   Order Comments: Nothing in the vagina for six weeks     Notify your health care provider if you experience any of the following:  temperature >100.4     Notify your health care provider if you experience any of the following:  persistent nausea and vomiting or diarrhea     Notify your health care provider if you experience any of the following:  severe uncontrolled pain     Notify your health care provider if you experience any of the following:  redness, tenderness, or signs of infection (pain, swelling, redness, odor or green/yellow discharge around incision site)     Notify your health care provider if you experience any of the following:  difficulty breathing or increased cough     Notify your health care provider if you experience any of the following:  severe persistent headache     Notify your health care provider if you experience any of the following:  persistent dizziness, light-headedness, or visual disturbances     Notify your health care provider if you experience any of the following:  increased confusion or weakness     Notify your health care provider if you experience any of the following:   Order Comments: Heavy vaginal bleeding >1 pad/hour for greater than 2 hours     Activity as tolerated     Medications:  Reconciled Home Medications:      Medication List      START taking these medications    oxyCODONE-acetaminophen 5-325 mg per tablet  Commonly known as:  PERCOCET  Take 1 tablet by mouth every 4 (four) hours as needed.        CHANGE how you take these medications    metoprolol tartrate 50 MG tablet  Commonly known as:  LOPRESSOR  Take 0.5 tablets (25 mg total) by mouth every 6 (six) hours.  What changed:    · how much to take  · when to take this        CONTINUE taking these medications    aspirin 81 MG EC tablet  Commonly known as:  ECOTRIN  Take 81 mg by mouth once  daily.     docusate sodium 100 MG capsule  Commonly known as:  COLACE  Take 100 mg by mouth daily as needed for Constipation.     ELIQUIS 5 mg Tab  Generic drug:  apixaban  Take 5 mg by mouth 2 (two) times daily.     gabapentin 300 MG capsule  Commonly known as:  NEURONTIN  Take 300 mg by mouth every evening.     HYDROcodone-acetaminophen  mg per tablet  Commonly known as:  NORCO  TAKE 1 TABLET BY MOUTH EVERY 6 HOURS May cause drowsiness no alcohol     OSTEO BI-FLEX ORAL  Take by mouth once daily.            Mini Liriano MD  Obstetrics & Gynecology  [facility]

## 2019-08-02 NOTE — SUBJECTIVE & OBJECTIVE
Interval History: HR better controlled overnight <110. She remains asymptomatic. Restarted on Eliquis overnight.     Review of Systems   Constitution: Negative for chills and fever.   Cardiovascular: Negative for chest pain, dyspnea on exertion, palpitations and syncope.   Respiratory: Negative for cough and sleep disturbances due to breathing.    Musculoskeletal: Negative for back pain.   Gastrointestinal: Negative for abdominal pain, nausea and vomiting.   Neurological: Negative for dizziness and focal weakness.   Psychiatric/Behavioral: Negative for altered mental status.     Objective:     Vital Signs (Most Recent):  Temp: 98.3 °F (36.8 °C) (08/02/19 0742)  Pulse: (!) 118 (08/02/19 0742)  Resp: 18 (08/02/19 0742)  BP: 107/61 (08/02/19 0742)  SpO2: (!) 91 % (08/02/19 0742) Vital Signs (24h Range):  Temp:  [96.4 °F (35.8 °C)-98.3 °F (36.8 °C)] 98.3 °F (36.8 °C)  Pulse:  [] 118  Resp:  [16-18] 18  SpO2:  [86 %-96 %] 91 %  BP: ()/(45-98) 107/61     Weight: 90.7 kg (199 lb 15.3 oz)  Body mass index is 28.69 kg/m².     SpO2: (!) 91 %  O2 Device (Oxygen Therapy): room air      Intake/Output Summary (Last 24 hours) at 8/2/2019 1050  Last data filed at 8/2/2019 0800  Gross per 24 hour   Intake 1370 ml   Output 1900 ml   Net -530 ml       Lines/Drains/Airways          None          Physical Exam   Constitutional: She is oriented to person, place, and time. She appears well-developed and well-nourished. No distress.   HENT:   Head: Normocephalic and atraumatic.   Eyes: Conjunctivae and EOM are normal.   Neck: Normal range of motion. No tracheal deviation present.   Cardiovascular: Normal rate, regular rhythm and normal heart sounds.   No murmur heard.  Irregularly irregular rhythm   Pulmonary/Chest: Effort normal and breath sounds normal. No respiratory distress. She has no wheezes.   Abdominal: Soft. Bowel sounds are normal. She exhibits no distension. There is no tenderness.   Musculoskeletal: Normal range of  motion. She exhibits no edema.   Neurological: She is alert and oriented to person, place, and time.   Skin: She is not diaphoretic.       Significant Labs: All pertinent lab results from the last 24 hours have been reviewed.    Significant Imaging: All pertinent images from the last 24h have been reviewed.

## 2019-08-02 NOTE — PROGRESS NOTES
Progress Note  Gynecology    Admit Date: 7/31/2019  LOS: 2    Reason for Admission:  S/P total abdominal hysterectomy    SUBJECTIVE:     Shalini Alcaraz is a 78 y.o. No obstetric history on file. who is POD#2 s/p RA-TLH/BSO for the treatment of endometrial carcinoma.    Pt doing well this morning. Pain is well controlled. She is tolerating a regular diet without N/V. Ambulating without difficulty. Voiding without difficulty. Passing flatus. She is not yet having bowel movements. Seen by cardiology yesterday. Lopressor now 25 q6 which has better controlled heart rate (remained <130 overnight).     OBJECTIVE:     Vital Signs   Temp:  [96.4 °F (35.8 °C)-98.8 °F (37.1 °C)] 97.9 °F (36.6 °C)  Pulse:  [] 106  Resp:  [16-18] 18  SpO2:  [86 %-96 %] 93 %  BP: ()/(45-98) 119/89      Intake/Output Summary (Last 24 hours) at 8/2/2019 0608  Last data filed at 8/2/2019 0500  Gross per 24 hour   Intake 1250 ml   Output 1400 ml   Net -150 ml       Physical Exam:  Gen: A&Ox3, NAD  CV: RRR  Pulm: LCTAB, normal respiratory effort  Abd: active bowel sounds, soft, non-distended, non-tender to palpation without rebound or guarding  Inc: port sites with minimal drainage; additional gauze and tape placed by nursing overnight   Ext: PPP, no peripheral edema, TEDs/SCDs in place  : Guillen in place draining clear yellow urine     Laboratory:  No results found for this or any previous visit (from the past 24 hour(s)).    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       Assessment: 78 y.o. female POD#2 s/p RA-TLH/BSO    Plan:   1. Post-op   - Routine post-op advances  - Continue PRN pain medications  - voiding without issue   - Encourage ambulation   - Encourage IS  -  CBC stable at 12.4/39   - UOP adequate   - tolerating regular diet   - Antiemetics prn nausea/vomiting.    2. History of a.fib   - patient with longstanding history of atrial fibrillation; normally on eliquis which was discontinued when she began having PMB  - on metoprolol 50 BID for rate control at home  - persistently tachycardic overnight POD#0 ranging from ; never stayed prolonged > 130  - on telemetry   - patient asymptomatic and overall feeling well  - eliquis restarted yesterday    - seen by cardiology with recommendation to alter lopressor frequency to 25 q6; this change has better controlled her rate and she remained <130 overnight. Most recent pulse 106   - close outpatient follow up with cardiology       3. Hypertension   - on lopressor   - BP: ()/() 106/71  - stable continue to monitor   - episode of low blood pressure yesterday which has resolved   - encourage adequate hydration       Dispo: As patient meets appropriate post-op milestones, plan for discharge to home today.    Mini Liriano MD  OBN, PGY-2

## 2019-08-02 NOTE — PROGRESS NOTES
[facility]  Cardiology  Progress Note    Patient Name: Shalini Alcaraz  MRN: [id number]  Admission Date: 7/31/2019  Hospital Length of Stay: 2 days  Code Status: Prior   Attending Physician: Tamika Quesada MD   Primary Care Physician: Vicenta López MD  Expected Discharge Date: 8/2/2019  Principal Problem:S/P total abdominal hysterectomy    Subjective:     Hospital Course:   No notes on file    Interval History: HR better controlled overnight <110. She remains asymptomatic. Restarted on Eliquis overnight.     Review of Systems   Constitution: Negative for chills and fever.   Cardiovascular: Negative for chest pain, dyspnea on exertion, palpitations and syncope.   Respiratory: Negative for cough and sleep disturbances due to breathing.    Musculoskeletal: Negative for back pain.   Gastrointestinal: Negative for abdominal pain, nausea and vomiting.   Neurological: Negative for dizziness and focal weakness.   Psychiatric/Behavioral: Negative for altered mental status.     Objective:     Vital Signs (Most Recent):  Temp: 98.3 °F (36.8 °C) (08/02/19 0742)  Pulse: (!) 118 (08/02/19 0742)  Resp: 18 (08/02/19 0742)  BP: 107/61 (08/02/19 0742)  SpO2: (!) 91 % (08/02/19 0742) Vital Signs (24h Range):  Temp:  [96.4 °F (35.8 °C)-98.3 °F (36.8 °C)] 98.3 °F (36.8 °C)  Pulse:  [] 118  Resp:  [16-18] 18  SpO2:  [86 %-96 %] 91 %  BP: ()/(45-98) 107/61     Weight: 90.7 kg (199 lb 15.3 oz)  Body mass index is 28.69 kg/m².     SpO2: (!) 91 %  O2 Device (Oxygen Therapy): room air      Intake/Output Summary (Last 24 hours) at 8/2/2019 1050  Last data filed at 8/2/2019 0800  Gross per 24 hour   Intake 1370 ml   Output 1900 ml   Net -530 ml       Lines/Drains/Airways          None          Physical Exam   Constitutional: She is oriented to person, place, and time. She appears well-developed and well-nourished. No distress.   HENT:   Head: Normocephalic and atraumatic.   Eyes: Conjunctivae and EOM are  normal.   Neck: Normal range of motion. No tracheal deviation present.   Cardiovascular: Normal rate, regular rhythm and normal heart sounds.   No murmur heard.  Irregularly irregular rhythm   Pulmonary/Chest: Effort normal and breath sounds normal. No respiratory distress. She has no wheezes.   Abdominal: Soft. Bowel sounds are normal. She exhibits no distension. There is no tenderness.   Musculoskeletal: Normal range of motion. She exhibits no edema.   Neurological: She is alert and oriented to person, place, and time.   Skin: She is not diaphoretic.       Significant Labs: All pertinent lab results from the last 24 hours have been reviewed.    Significant Imaging: All pertinent images from the last 24h have been reviewed.      Assessment and Plan:       Atrial fibrillation  - HR better controlled overnight. Continue to uptitrate beta blocker as tolerated. She has an outpatient cardiologist with whom she has followed for >10 years. She will call to arrange an appointment.  - Patient has afib and history of mitral valve replacement. Ideally she should be on warfarin. Discussed with outpatient cardiologist Dr. Carlos Newell - he is aware and prefers to keep her on Eliquis for now. She will follow-up with him soon after discharge.       VTE Risk Mitigation (From admission, onward)        Ordered     apixaban tablet 5 mg  2 times daily      08/01/19 1206     IP VTE HIGH RISK PATIENT  Once      07/31/19 1634     Place sequential compression device  Until discontinued      07/31/19 1634          Fernandez Paige MD  Cardiology  [facility]

## 2019-08-02 NOTE — PLAN OF CARE
POD#2 s/p RA-TLH/BSO for the treatment of endometrial carcinoma. Plans for patient to discharge home today. Patient's Eliquis restarted yesterday. No discharge needs identified. Follow-up scheduled as listed below. Discharge and follow-up instructions to be completed by the bedside nurse.    Future Appointments   Date Time Provider Department Center   8/20/2019 11:15 AM Tamika Quesada MD [facility]      08/02/19 0830   Final Note   Assessment Type Final Discharge Note   Anticipated Discharge Disposition Home   What phone number can be called within the next 1-3 days to see how you are doing after discharge?   ([phone])   Hospital Follow Up  Appt(s) scheduled? Yes   Discharge plans and expectations educations in teach back method with documentation complete? Yes  (per bedside nurse)

## 2019-08-02 NOTE — PLAN OF CARE
Problem: Adult Inpatient Plan of Care  Goal: Plan of Care Review  Outcome: Ongoing (interventions implemented as appropriate)  Pt AAOX4. Pain managed with PRN pain meds. Regular diet, no complaints of nausea or vomiting. Ambulates to bathroom. Adequate urine output overnight. VS stable on 1L NC. Pt slept between care. Bed low and locked, call bell within reach. Will continue to monitor.

## 2019-08-02 NOTE — NURSING
Saline lock removed, telemetry removed, patient showered. Discharge instructions with patient and . Prescription to be delivered. Awaiting ride home.

## 2019-08-16 ENCOUNTER — TELEPHONE (OUTPATIENT)
Dept: GYNECOLOGIC ONCOLOGY | Facility: CLINIC | Age: 79
End: 2019-08-16

## 2019-08-16 NOTE — TELEPHONE ENCOUNTER
Called to check on patient after surgery. Doing well post operatively. She is still working with her cardiologist for arhythmia.     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     Due to depth of invasion standard recommendations are adjuvant chemotherapy with 6 cycles of carboplatin/taxol and adjuvant VCBT. Will ask pathology for HER2.     Will plan to treat locally at [facility].

## 2019-08-19 ENCOUNTER — TELEPHONE (OUTPATIENT)
Dept: GYNECOLOGIC ONCOLOGY | Facility: CLINIC | Age: 79
End: 2019-08-19

## 2019-08-20 ENCOUNTER — OFFICE VISIT (OUTPATIENT)
Dept: GYNECOLOGIC ONCOLOGY | Facility: CLINIC | Age: 79
End: 2019-08-20
Payer: MEDICARE

## 2019-08-20 VITALS
SYSTOLIC BLOOD PRESSURE: 140 MMHG | WEIGHT: 205.5 LBS | HEIGHT: 70 IN | HEART RATE: 54 BPM | DIASTOLIC BLOOD PRESSURE: 74 MMHG | BODY MASS INDEX: 29.42 KG/M2

## 2019-08-20 DIAGNOSIS — C54.1 PAPILLARY SEROUS ENDOMETRIAL ADENOCARCINOMA: Primary | ICD-10-CM

## 2019-08-20 DIAGNOSIS — Z90.710 S/P TOTAL ABDOMINAL HYSTERECTOMY: ICD-10-CM

## 2019-08-20 PROCEDURE — 99213 OFFICE O/P EST LOW 20 MIN: CPT | Mod: PBBFAC | Performed by: OBSTETRICS & GYNECOLOGY

## 2019-08-20 PROCEDURE — 99024 PR POST-OP FOLLOW-UP VISIT: ICD-10-PCS | Mod: POP,,, | Performed by: OBSTETRICS & GYNECOLOGY

## 2019-08-20 PROCEDURE — 99999 PR PBB SHADOW E&M-EST. PATIENT-LVL III: ICD-10-PCS | Mod: PBBFAC,,, | Performed by: OBSTETRICS & GYNECOLOGY

## 2019-08-20 PROCEDURE — 99999 PR PBB SHADOW E&M-EST. PATIENT-LVL III: CPT | Mod: PBBFAC,,, | Performed by: OBSTETRICS & GYNECOLOGY

## 2019-08-20 PROCEDURE — 99024 POSTOP FOLLOW-UP VISIT: CPT | Mod: POP,,, | Performed by: OBSTETRICS & GYNECOLOGY

## 2019-08-20 RX ORDER — DIGOXIN 125 MCG
125 TABLET ORAL DAILY
Refills: 11 | COMMUNITY
Start: 2019-08-14

## 2019-08-20 NOTE — PROGRESS NOTES
Subjective:      Patient ID: Shalini Alcaraz is a 78 y.o. female.    Chief Complaint: Post-op Evaluation      HPI  CT CAP 7/3/19 pre op showed no evidence of metastatic disease.   Underwent RTLH/BSO/PPALND (right, left omitted due to anatomy of pelvic kidney)/OMX 7/31/19  Final pathology reviewed Stage IA UPSC  FINAL PATHOLOGIC DIAGNOSIS  1. 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  Depth of stromal invasion: 0.4 cm/1.3 cm myometrial thickness.  Surgical margins are negative for malignancy.  Leiomyomata, intramural.  Cervix with chronic inflammation, reactive changes and Nabothian cysts, uninvolved by malignancy.  Bilateral ovaries with simple inclusion cysts, uninvolved by malignancy.  Bilateral fallopian tubes and fimbria with paratubal cyst, uninvolved by malignancy.  Comment: The vast majority of tumor involves the exophytic portion of the polyp but there is a small area of  myometrial invasion at the base. The tumor exhibits morphology in keeping with serous carcinoma. Tumor cells  are positive for P16 and completely negative for P53 (indicative of mutation status). The staining results are also  consistent with serous carcinoma.  2. Right pelvic and obturator lymph nodes, dissection:  Seven lymph nodes, negative for malignancy (0/7).  3. Right kalyan-aortic lymph nodes, dissection:  Three lymph nodes, negative for malignancy (0/3).  4. Omentum, excision:  Fibroadipose tissue, negative for malignancy.    Presents today for post operative visit. Recovering well from surgery. Up and about, eating, +BM.     History:  Referred by Dr. Qamar Pires for newly diagnosed endometrial cancer.     Postmenopausal bleeding.   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     Pelvic US 6/6/19  UT 4.8x3.8x8.0cm  Thickened endometrium 2.0cm  ROV 1.1cm and LOV 2.4cm  Left pelvic kidney  and parapelvic cyst 4.1cm     Pap 6/3/19 negative.      Medical comorbidities include afib on long term anticoagulation with Eliquis and mitral valve replacement for MVP, HTN. Her cardiologist is Dr. Carlos Newell in [place]. She has been off Eliquis since she started with postmenopausal bleeding in coordination with Dr. Newell.      Prior abdominal surgeries include open appendectomy, laparoscopic cholecystectomy.  x 3. She has a pelvic kidney. BMI 31.       No family history of breast, colon, ovarian, or uterine cancer.   Review of Systems   Constitutional: Negative for appetite change, chills, fatigue and fever.   HENT: Negative for mouth sores.    Respiratory: Negative for cough and shortness of breath.    Cardiovascular: Negative for leg swelling.   Gastrointestinal: Negative for abdominal pain, blood in stool, constipation and diarrhea.   Endocrine: Negative for cold intolerance.   Genitourinary: Negative for dysuria and vaginal bleeding.   Musculoskeletal: Negative for myalgias.   Skin: Negative for rash.   Allergic/Immunologic: Negative.    Neurological: Negative for weakness and numbness.   Hematological: Negative for adenopathy. Does not bruise/bleed easily.   Psychiatric/Behavioral: Negative for confusion.       Objective:   Physical Exam:   Constitutional: She is oriented to person, place, and time. She appears well-developed and well-nourished.    HENT:   Head: Normocephalic and atraumatic.    Eyes: Pupils are equal, round, and reactive to light. EOM are normal.    Neck: Normal range of motion. Neck supple. No thyromegaly present.    Cardiovascular: Normal rate, regular rhythm and intact distal pulses.     Pulmonary/Chest: Effort normal and breath sounds normal. No respiratory distress. She has no wheezes.        Abdominal: Soft. Bowel sounds are normal. She exhibits abdominal incision. She exhibits no distension, no ascites and no mass. There is no tenderness.             Musculoskeletal: Normal  range of motion and moves all extremeties.      Lymphadenopathy:     She has no cervical adenopathy.        Right: No supraclavicular adenopathy present.        Left: No supraclavicular adenopathy present.    Neurological: She is alert and oriented to person, place, and time.    Skin: Skin is warm and dry. No rash noted.    Psychiatric: She has a normal mood and affect.       Assessment:     1. Papillary serous endometrial adenocarcinoma    2. s/p RA-TLH/BSO/Right PLND/omentectomy        Plan:   No orders of the defined types were placed in this encounter.    Recovering appropriately from surgery.   Reviewed pathology in detail. Stage IA UPSC. Given that there is some myometrial invasion standard management include adjuvant chemotherapy and vaginal cuff brachytherapy even for early stage disease given serous histology. Recommend 6 cycles of carboplatin/taxol. We can coordinate care in [place] for her adjuvant therapy.   HER2 pending from pathology to evaluate +/- herceptin.   RTC 4 weeks for follow up post operative visit and continued coordination of care.

## 2019-08-20 NOTE — LETTER
August 24, 2019        Qamar Pires MD  [address] [facility]  [place] [address]             [address]  [address], [address]  [facility] [address]  Phone: [phone]  Fax: [phone]   Patient: Shalini Alcaraz   MR Number: [id number]   YOB: 1940   Date of Visit: 8/20/2019       Dear Dr. Pires:    Thank you for referring Shalini Alcaraz to me for evaluation. Attached you will find relevant portions of my assessment and plan of care.    If you have questions, please do not hesitate to call me. I look forward to following Shalini Alcaraz along with you.    Sincerely,      Tamika Quesada MD            CC  No Recipients    Enclosure

## 2019-09-03 ENCOUNTER — TELEPHONE (OUTPATIENT)
Dept: GYNECOLOGIC ONCOLOGY | Facility: CLINIC | Age: 79
End: 2019-09-03

## 2019-09-03 NOTE — TELEPHONE ENCOUNTER
RN called [facility] at [phone]. Offices closed for the day.  RN called patient to inform her of pending status on chemo referral. Will attempt to call [place] again tomorrow to follow up and schedule patient chemotherapy.     ----- Message from Lalita Ho sent at 9/3/2019  1:42 PM CDT -----  Contact: WARREN HERRERA [[id number]]  Name of Who is Calling: WARREN HERRERA [[id number]]    What is the request in detail: Would like to inform staff that she has not heard from [facility] to set up chemotherapy. Please contact to further discuss and advise      Can the clinic reply by [facility]: no    What Number to Call Back if not in [facility]: [phone]

## 2019-09-05 ENCOUNTER — TELEPHONE (OUTPATIENT)
Dept: GYNECOLOGIC ONCOLOGY | Facility: CLINIC | Age: 79
End: 2019-09-05

## 2019-09-05 NOTE — TELEPHONE ENCOUNTER
----- Message from Jelena Emery sent at 9/5/2019  2:46 PM CDT -----  Patient returned missed call from yesterday. Patient unsure what the call could be about.

## 2019-09-05 NOTE — TELEPHONE ENCOUNTER
----- Message from Alok Morley sent at 9/5/2019 11:08 AM CDT -----  Contact: [facility]  Name of Who is Calling: Kaleigh with [facility]    What is the request in detail: confirming referral was received and patient is scheduled 09/09/19 @ 1:00pm with Dr. Annie Leiva. Please contact to further discuss and advise      Can the clinic reply by [facility]: no    [facility]:[phone]

## 2019-09-11 ENCOUNTER — HISTORICAL (OUTPATIENT)
Dept: RADIATION THERAPY | Facility: HOSPITAL | Age: 79
End: 2019-09-11

## 2019-09-11 ENCOUNTER — TELEPHONE (OUTPATIENT)
Dept: ADMINISTRATIVE | Facility: OTHER | Age: 79
End: 2019-09-11

## 2019-09-18 ENCOUNTER — HISTORICAL (OUTPATIENT)
Dept: RADIATION THERAPY | Facility: HOSPITAL | Age: 79
End: 2019-09-18

## 2019-09-19 LAB
ABS NEUT (OLG): 5.22 X10(3)/MCL (ref 2.1–9.2)
ALBUMIN SERPL-MCNC: 3.6 GM/DL (ref 3.4–5)
ALBUMIN/GLOB SERPL: 1.2 {RATIO}
ALP SERPL-CCNC: 74 UNIT/L (ref 38–126)
ALT SERPL-CCNC: 24 UNIT/L (ref 12–78)
APTT PPP: 30.3 SECOND(S) (ref 24.2–33.9)
AST SERPL-CCNC: 17 UNIT/L (ref 15–37)
BASOPHILS # BLD AUTO: 0.1 X10(3)/MCL (ref 0–0.2)
BASOPHILS NFR BLD AUTO: 1 %
BILIRUB SERPL-MCNC: 0.8 MG/DL (ref 0.2–1)
BILIRUBIN DIRECT+TOT PNL SERPL-MCNC: 0.1 MG/DL (ref 0–0.2)
BILIRUBIN DIRECT+TOT PNL SERPL-MCNC: 0.7 MG/DL (ref 0–0.8)
BUN SERPL-MCNC: 12 MG/DL (ref 7–18)
CALCIUM SERPL-MCNC: 9.2 MG/DL (ref 8.5–10.1)
CHLORIDE SERPL-SCNC: 104 MMOL/L (ref 98–107)
CO2 SERPL-SCNC: 29 MMOL/L (ref 21–32)
CREAT SERPL-MCNC: 0.84 MG/DL (ref 0.55–1.02)
EOSINOPHIL # BLD AUTO: 0.4 X10(3)/MCL (ref 0–0.9)
EOSINOPHIL NFR BLD AUTO: 5 %
ERYTHROCYTE [DISTWIDTH] IN BLOOD BY AUTOMATED COUNT: 13.5 % (ref 11.5–17)
GLOBULIN SER-MCNC: 3 GM/DL (ref 2.4–3.5)
GLUCOSE SERPL-MCNC: 81 MG/DL (ref 74–106)
GROUP & RH: NORMAL
HCT VFR BLD AUTO: 41.8 % (ref 37–47)
HGB BLD-MCNC: 13.1 GM/DL (ref 12–16)
INR PPP: 1.1 (ref 0–1.3)
LYMPHOCYTES # BLD AUTO: 1.7 X10(3)/MCL (ref 0.6–4.6)
LYMPHOCYTES NFR BLD AUTO: 21 %
MCH RBC QN AUTO: 29 PG (ref 27–31)
MCHC RBC AUTO-ENTMCNC: 31.3 GM/DL (ref 33–36)
MCV RBC AUTO: 92.7 FL (ref 80–94)
MONOCYTES # BLD AUTO: 0.7 X10(3)/MCL (ref 0.1–1.3)
MONOCYTES NFR BLD AUTO: 8 %
NEUTROPHILS # BLD AUTO: 5.22 X10(3)/MCL (ref 2.1–9.2)
NEUTROPHILS NFR BLD AUTO: 64 %
PLATELET # BLD AUTO: 207 X10(3)/MCL (ref 130–400)
PMV BLD AUTO: 9.7 FL (ref 9.4–12.4)
POTASSIUM SERPL-SCNC: 4.5 MMOL/L (ref 3.5–5.1)
PROT SERPL-MCNC: 6.6 GM/DL (ref 6.4–8.2)
PROTHROMBIN TIME: 14.4 SECOND(S) (ref 12–14)
RBC # BLD AUTO: 4.51 X10(6)/MCL (ref 4.2–5.4)
SODIUM SERPL-SCNC: 139 MMOL/L (ref 136–145)
WBC # SPEC AUTO: 8.1 X10(3)/MCL (ref 4.5–11.5)

## 2019-09-20 ENCOUNTER — HISTORICAL (OUTPATIENT)
Dept: ADMINISTRATIVE | Facility: HOSPITAL | Age: 79
End: 2019-09-20

## 2019-09-24 ENCOUNTER — HISTORICAL (OUTPATIENT)
Dept: INFUSION THERAPY | Facility: HOSPITAL | Age: 79
End: 2019-09-24

## 2019-09-24 LAB
ABS NEUT (OLG): 5.71 X10(3)/MCL (ref 2.1–9.2)
ALBUMIN SERPL-MCNC: 3.5 GM/DL (ref 3.4–5)
ALP SERPL-CCNC: 76 UNIT/L (ref 38–126)
ALT SERPL-CCNC: 16 UNIT/L (ref 12–78)
ANION GAP SERPL CALC-SCNC: 14 MMOL/L
AST SERPL-CCNC: 17 UNIT/L (ref 15–37)
BASOPHILS # BLD AUTO: 0.1 X10(3)/MCL (ref 0–0.2)
BASOPHILS NFR BLD AUTO: 0.7 %
BILIRUB SERPL-MCNC: 0.4 MG/DL (ref 0.2–1)
BILIRUBIN DIRECT+TOT PNL SERPL-MCNC: 0.1 MG/DL (ref 0–0.5)
BILIRUBIN DIRECT+TOT PNL SERPL-MCNC: 0.3 MG/DL (ref 0–0.8)
BUN SERPL-MCNC: 15 MG/DL (ref 8–26)
CANCER AG125 SERPL-ACNC: 17.7 IU/ML (ref 1.5–35)
CHLORIDE SERPL-SCNC: 101 MMOL/L (ref 98–109)
CREAT SERPL-MCNC: 0.9 MG/DL (ref 0.6–1.3)
EOSINOPHIL # BLD AUTO: 0.9 X10(3)/MCL (ref 0–0.9)
EOSINOPHIL NFR BLD AUTO: 10.1 %
ERYTHROCYTE [DISTWIDTH] IN BLOOD BY AUTOMATED COUNT: 13.3 % (ref 11.5–17)
GLUCOSE SERPL-MCNC: 118 MG/DL (ref 70–105)
HCT VFR BLD AUTO: 41.6 % (ref 37–47)
HCT VFR BLD CALC: 40 % (ref 38–51)
HGB BLD-MCNC: 12.8 GM/DL (ref 12–16)
HGB BLD-MCNC: 13.6 MG/DL (ref 12–17)
LIVER PROFILE INTERP: NORMAL
LYMPHOCYTES # BLD AUTO: 1.5 X10(3)/MCL (ref 0.6–4.6)
LYMPHOCYTES NFR BLD AUTO: 17.1 %
MCH RBC QN AUTO: 28.8 PG (ref 27–31)
MCHC RBC AUTO-ENTMCNC: 30.8 GM/DL (ref 33–36)
MCV RBC AUTO: 93.7 FL (ref 80–94)
MONOCYTES # BLD AUTO: 0.6 X10(3)/MCL (ref 0.1–1.3)
MONOCYTES NFR BLD AUTO: 6.8 %
NEUTROPHILS # BLD AUTO: 5.7 X10(3)/MCL (ref 2.1–9.2)
NEUTROPHILS NFR BLD AUTO: 65.1 %
PLATELET # BLD AUTO: 216 X10(3)/MCL (ref 130–400)
PMV BLD AUTO: 9.5 FL (ref 9.4–12.4)
POC IONIZED CALCIUM: 1.22 MMOL/L (ref 1.12–1.32)
POC TCO2: 29 MMOL/L (ref 24–29)
POTASSIUM BLD-SCNC: 4.2 MMOL/L (ref 3.5–4.9)
PROT SERPL-MCNC: 6.8 GM/DL (ref 6.4–8.2)
RBC # BLD AUTO: 4.44 X10(6)/MCL (ref 4.2–5.4)
SODIUM BLD-SCNC: 139 MMOL/L (ref 138–146)
WBC # SPEC AUTO: 8.8 X10(3)/MCL (ref 4.5–11.5)

## 2019-09-27 ENCOUNTER — TELEPHONE (OUTPATIENT)
Dept: ADMINISTRATIVE | Facility: OTHER | Age: 79
End: 2019-09-27

## 2019-09-30 ENCOUNTER — OFFICE VISIT (OUTPATIENT)
Dept: GYNECOLOGIC ONCOLOGY | Facility: CLINIC | Age: 79
End: 2019-09-30
Payer: MEDICARE

## 2019-09-30 VITALS
BODY MASS INDEX: 29.38 KG/M2 | SYSTOLIC BLOOD PRESSURE: 144 MMHG | HEIGHT: 70 IN | DIASTOLIC BLOOD PRESSURE: 66 MMHG | WEIGHT: 205.25 LBS | HEART RATE: 58 BPM

## 2019-09-30 DIAGNOSIS — C54.1 PAPILLARY SEROUS ENDOMETRIAL ADENOCARCINOMA: Primary | ICD-10-CM

## 2019-09-30 DIAGNOSIS — Z90.710 S/P TOTAL ABDOMINAL HYSTERECTOMY: ICD-10-CM

## 2019-09-30 PROCEDURE — 99999 PR PBB SHADOW E&M-EST. PATIENT-LVL III: ICD-10-PCS | Mod: PBBFAC,,, | Performed by: OBSTETRICS & GYNECOLOGY

## 2019-09-30 PROCEDURE — 99213 OFFICE O/P EST LOW 20 MIN: CPT | Mod: PBBFAC | Performed by: OBSTETRICS & GYNECOLOGY

## 2019-09-30 PROCEDURE — 99024 POSTOP FOLLOW-UP VISIT: CPT | Mod: POP,,, | Performed by: OBSTETRICS & GYNECOLOGY

## 2019-09-30 PROCEDURE — 99024 PR POST-OP FOLLOW-UP VISIT: ICD-10-PCS | Mod: POP,,, | Performed by: OBSTETRICS & GYNECOLOGY

## 2019-09-30 PROCEDURE — 99999 PR PBB SHADOW E&M-EST. PATIENT-LVL III: CPT | Mod: PBBFAC,,, | Performed by: OBSTETRICS & GYNECOLOGY

## 2019-09-30 RX ORDER — PROCHLORPERAZINE MALEATE 5 MG
5 TABLET ORAL EVERY 6 HOURS PRN
Refills: 0 | COMMUNITY
Start: 2019-09-24

## 2019-09-30 RX ORDER — ONDANSETRON HYDROCHLORIDE 8 MG/1
TABLET, FILM COATED ORAL
COMMUNITY
Start: 2019-09-12

## 2019-09-30 RX ORDER — ONDANSETRON HYDROCHLORIDE 8 MG/1
TABLET, FILM COATED ORAL
Refills: 4 | COMMUNITY
Start: 2019-09-24

## 2019-10-05 NOTE — PROGRESS NOTES
Subjective:      Patient ID: Shalini Alcaraz is a 78 y.o. female.    Chief Complaint: Papillary serous endometrial adenocarcinoma and Post-op Evaluation (4 wk )      HPI  CT CAP 7/3/19 pre op showed no evidence of metastatic disease.   Underwent RTLH/BSO/PPALND (right, left omitted due to anatomy of pelvic kidney)/OMX 19  Final pathology reviewed Stage IA UPSC with myometrial invasion.     Presents today for follow up post operative visit. Continues to recovering well from surgery. Has established with Dr. Leiva and Dr. Verdin for adjuvant therapy in [place].      History:  Referred by Dr. Qamar Pires for newly diagnosed endometrial cancer.     Postmenopausal bleeding.   EMB 19 showed high grade serous carcinoma.     Pelvic US 19  UT 4.8x3.8x8.0cm  Thickened endometrium 2.0cm  ROV 1.1cm and LOV 2.4cm  Left pelvic kidney and parapelvic cyst 4.1cm     Pap 6/3/19 negative.      Medical comorbidities include afib on long term anticoagulation with Eliquis and mitral valve replacement for MVP, HTN. Her cardiologist is Dr. Carlos Newell in [place]. She has been off Eliquis since she started with postmenopausal bleeding in coordination with Dr. Newell.      Prior abdominal surgeries include open appendectomy, laparoscopic cholecystectomy.  x 3. She has a pelvic kidney. BMI 31.       No family history of breast, colon, ovarian, or uterine cancer.   Review of Systems   Constitutional: Negative for appetite change, chills, fatigue and fever.   HENT: Negative for mouth sores.    Respiratory: Negative for cough and shortness of breath.    Cardiovascular: Negative for leg swelling.   Gastrointestinal: Negative for abdominal pain, blood in stool, constipation and diarrhea.   Endocrine: Negative for cold intolerance.   Genitourinary: Negative for dysuria and vaginal bleeding.   Musculoskeletal: Negative for myalgias.   Skin: Negative for rash.   Allergic/Immunologic: Negative.    Neurological: Negative for  weakness and numbness.   Hematological: Negative for adenopathy. Does not bruise/bleed easily.   Psychiatric/Behavioral: Negative for confusion.       Objective:   Physical Exam:   Constitutional: She is oriented to person, place, and time. She appears well-developed and well-nourished.    HENT:   Head: Normocephalic and atraumatic.    Eyes: Pupils are equal, round, and reactive to light. EOM are normal.    Neck: Normal range of motion. Neck supple. No thyromegaly present.    Cardiovascular: Normal rate, regular rhythm and intact distal pulses.     Pulmonary/Chest: Effort normal and breath sounds normal. No respiratory distress. She has no wheezes.        Abdominal: Soft. Bowel sounds are normal. She exhibits no distension, no ascites and no mass. There is no tenderness.     Genitourinary: Rectum normal and vagina normal. Pelvic exam was performed with patient supine. There is no lesion on the right labia. There is no lesion on the left labia. Uterus is absent. There is an absent adnexa. Right adnexum displays no mass. Left adnexum displays no mass. Vaginal cuff normal.Cervix exhibits absence.   Genitourinary Comments: Vaginal cuff healing well.            Musculoskeletal: Normal range of motion and moves all extremeties.      Lymphadenopathy:     She has no cervical adenopathy.        Right: No inguinal and no supraclavicular adenopathy present.        Left: No inguinal and no supraclavicular adenopathy present.    Neurological: She is alert and oriented to person, place, and time.    Skin: Skin is warm and dry. No rash noted.    Psychiatric: She has a normal mood and affect.       Assessment:     1. Papillary serous endometrial adenocarcinoma    2. s/p RA-TLH/BSO/Right PLND/omentectomy        Plan:   No orders of the defined types were placed in this encounter.    Recovering well from surgery.   Adjuvant chemotherapy and RT in [place] with Adolfo Leiva and [place].   RTC with me to initiate surveillance at the  completion of therapy.

## 2019-10-10 ENCOUNTER — HISTORICAL (OUTPATIENT)
Dept: ADMINISTRATIVE | Facility: HOSPITAL | Age: 79
End: 2019-10-10

## 2019-10-10 LAB
ABS NEUT (OLG): 2.87 X10(3)/MCL (ref 2.1–9.2)
ALBUMIN SERPL-MCNC: 3.5 GM/DL (ref 3.4–5)
ALP SERPL-CCNC: 81 UNIT/L (ref 38–126)
ALT SERPL-CCNC: 34 UNIT/L (ref 12–78)
ANION GAP SERPL CALC-SCNC: 9 MMOL/L
AST SERPL-CCNC: 21 UNIT/L (ref 15–37)
BASOPHILS # BLD AUTO: 0.1 X10(3)/MCL (ref 0–0.2)
BASOPHILS NFR BLD AUTO: 1 %
BILIRUB SERPL-MCNC: 0.3 MG/DL (ref 0.2–1)
BILIRUBIN DIRECT+TOT PNL SERPL-MCNC: 0.1 MG/DL (ref 0–0.5)
BILIRUBIN DIRECT+TOT PNL SERPL-MCNC: 0.2 MG/DL (ref 0–0.8)
BUN SERPL-MCNC: 9 MG/DL (ref 8–26)
CHLORIDE SERPL-SCNC: 100 MMOL/L (ref 98–109)
CREAT SERPL-MCNC: 0.9 MG/DL (ref 0.6–1.3)
EOSINOPHIL # BLD AUTO: 0 X10(3)/MCL (ref 0–0.9)
EOSINOPHIL NFR BLD AUTO: 0.5 %
ERYTHROCYTE [DISTWIDTH] IN BLOOD BY AUTOMATED COUNT: 13.5 % (ref 11.5–17)
GLUCOSE SERPL-MCNC: 97 MG/DL (ref 70–105)
HCT VFR BLD AUTO: 41.1 % (ref 37–47)
HCT VFR BLD CALC: 40 % (ref 38–51)
HGB BLD-MCNC: 12.9 GM/DL (ref 12–16)
HGB BLD-MCNC: 13.6 MG/DL (ref 12–17)
LIVER PROFILE INTERP: NORMAL
LYMPHOCYTES # BLD AUTO: 2.2 X10(3)/MCL (ref 0.6–4.6)
LYMPHOCYTES NFR BLD AUTO: 34.5 %
MCH RBC QN AUTO: 29.1 PG (ref 27–31)
MCHC RBC AUTO-ENTMCNC: 31.4 GM/DL (ref 33–36)
MCV RBC AUTO: 92.6 FL (ref 80–94)
MONOCYTES # BLD AUTO: 0.9 X10(3)/MCL (ref 0.1–1.3)
MONOCYTES NFR BLD AUTO: 14.6 %
NEUTROPHILS # BLD AUTO: 2.9 X10(3)/MCL (ref 2.1–9.2)
NEUTROPHILS NFR BLD AUTO: 45.4 %
PLATELET # BLD AUTO: 226 X10(3)/MCL (ref 130–400)
PMV BLD AUTO: 8.7 FL (ref 9.4–12.4)
POC IONIZED CALCIUM: 1.23 MMOL/L (ref 1.12–1.32)
POC TCO2: 32 MMOL/L (ref 24–29)
POTASSIUM BLD-SCNC: 4.7 MMOL/L (ref 3.5–4.9)
PROT SERPL-MCNC: 6.7 GM/DL (ref 6.4–8.2)
RBC # BLD AUTO: 4.44 X10(6)/MCL (ref 4.2–5.4)
SODIUM BLD-SCNC: 136 MMOL/L (ref 138–146)
WBC # SPEC AUTO: 6.3 X10(3)/MCL (ref 4.5–11.5)

## 2019-10-15 ENCOUNTER — HISTORICAL (OUTPATIENT)
Dept: INFUSION THERAPY | Facility: HOSPITAL | Age: 79
End: 2019-10-15

## 2019-10-15 LAB
ABS NEUT (OLG): 6.89 X10(3)/MCL (ref 2.1–9.2)
ALBUMIN SERPL-MCNC: 3.5 GM/DL (ref 3.4–5)
ALP SERPL-CCNC: 80 UNIT/L (ref 38–126)
ALT SERPL-CCNC: 30 UNIT/L (ref 12–78)
ANION GAP SERPL CALC-SCNC: 13 MMOL/L
AST SERPL-CCNC: 20 UNIT/L (ref 15–37)
BASOPHILS # BLD AUTO: 0 X10(3)/MCL (ref 0–0.2)
BASOPHILS NFR BLD AUTO: 0.5 %
BILIRUB SERPL-MCNC: 0.4 MG/DL (ref 0.2–1)
BILIRUBIN DIRECT+TOT PNL SERPL-MCNC: 0.1 MG/DL (ref 0–0.2)
BILIRUBIN DIRECT+TOT PNL SERPL-MCNC: 0.3 MG/DL (ref 0–0.8)
BUN SERPL-MCNC: 21 MG/DL (ref 8–26)
CHLORIDE SERPL-SCNC: 102 MMOL/L (ref 98–109)
CREAT SERPL-MCNC: 0.9 MG/DL (ref 0.6–1.3)
EOSINOPHIL # BLD AUTO: 0.1 X10(3)/MCL (ref 0–0.9)
EOSINOPHIL NFR BLD AUTO: 1.1 %
ERYTHROCYTE [DISTWIDTH] IN BLOOD BY AUTOMATED COUNT: 13.8 % (ref 11.5–17)
GLUCOSE SERPL-MCNC: 111 MG/DL (ref 70–105)
HCT VFR BLD AUTO: 41.4 % (ref 37–47)
HCT VFR BLD CALC: 39 % (ref 38–51)
HGB BLD-MCNC: 13.2 GM/DL (ref 12–16)
HGB BLD-MCNC: 13.3 MG/DL (ref 12–17)
LIVER PROFILE INTERP: NORMAL
LYMPHOCYTES # BLD AUTO: 2.1 X10(3)/MCL (ref 0.6–4.6)
LYMPHOCYTES NFR BLD AUTO: 20.4 %
MCH RBC QN AUTO: 29.3 PG (ref 27–31)
MCHC RBC AUTO-ENTMCNC: 31.9 GM/DL (ref 33–36)
MCV RBC AUTO: 91.8 FL (ref 80–94)
MONOCYTES # BLD AUTO: 1 X10(3)/MCL (ref 0.1–1.3)
MONOCYTES NFR BLD AUTO: 9.4 %
NEUTROPHILS # BLD AUTO: 6.9 X10(3)/MCL (ref 2.1–9.2)
NEUTROPHILS NFR BLD AUTO: 67.1 %
PLATELET # BLD AUTO: 230 X10(3)/MCL (ref 130–400)
PMV BLD AUTO: 9.1 FL (ref 9.4–12.4)
POC IONIZED CALCIUM: 1.17 MMOL/L (ref 1.12–1.32)
POC TCO2: 29 MMOL/L (ref 24–29)
POTASSIUM BLD-SCNC: 4.3 MMOL/L (ref 3.5–4.9)
PROT SERPL-MCNC: 6.7 GM/DL (ref 6.4–8.2)
RBC # BLD AUTO: 4.51 X10(6)/MCL (ref 4.2–5.4)
SODIUM BLD-SCNC: 139 MMOL/L (ref 138–146)
WBC # SPEC AUTO: 10.3 X10(3)/MCL (ref 4.5–11.5)

## 2019-10-22 ENCOUNTER — HISTORICAL (OUTPATIENT)
Dept: ADMINISTRATIVE | Facility: HOSPITAL | Age: 79
End: 2019-10-22

## 2019-10-22 LAB
ABS NEUT (OLG): 3.31 X10(3)/MCL (ref 2.1–9.2)
ACANTHOCYTES (OLG): 0
BASOPHILS NFR BLD MANUAL: 1 % (ref 0–2)
BNP BLD-MCNC: 70 PG/ML (ref 0–125)
BUN SERPL-MCNC: 18 MG/DL (ref 7–18)
BURR CELLS BLD QL SMEAR: 0
CALCIUM SERPL-MCNC: 8.8 MG/DL (ref 8.5–10.1)
CHLORIDE SERPL-SCNC: 103 MMOL/L (ref 98–107)
CO2 SERPL-SCNC: 32 MMOL/L (ref 21–32)
CREAT SERPL-MCNC: 0.84 MG/DL (ref 0.55–1.02)
CREAT/UREA NIT SERPL: 21.4
DACRYOCYTES BLD QL SMEAR: 0
EOSINOPHIL NFR BLD MANUAL: 3 % (ref 0–8)
ERYTHROCYTE [DISTWIDTH] IN BLOOD BY AUTOMATED COUNT: 13.6 % (ref 11.5–17)
GLUCOSE SERPL-MCNC: 112 MG/DL (ref 74–106)
HCT VFR BLD AUTO: 40 % (ref 37–47)
HGB BLD-MCNC: 12.5 GM/DL (ref 12–16)
LYMPHOCYTES NFR BLD MANUAL: 25 % (ref 13–40)
MCH RBC QN AUTO: 28.7 PG (ref 27–31)
MCHC RBC AUTO-ENTMCNC: 31.3 GM/DL (ref 33–36)
MCV RBC AUTO: 91.7 FL (ref 80–94)
MONOCYTES NFR BLD MANUAL: 3 % (ref 2–11)
NEUTROPHILS NFR BLD MANUAL: 67 % (ref 47–80)
OVALOCYTES BLD QL SMEAR: 0
PLATELET # BLD AUTO: 151 X10(3)/MCL (ref 130–400)
PLATELET # BLD EST: NORMAL 10*3/UL
PMV BLD AUTO: 10 FL (ref 9.4–12.4)
POTASSIUM SERPL-SCNC: 4.4 MMOL/L (ref 3.5–5.1)
RBC # BLD AUTO: 4.36 X10(6)/MCL (ref 4.2–5.4)
SODIUM SERPL-SCNC: 138 MMOL/L (ref 136–145)
SPHEROCYTES BLD QL SMEAR: 0
WBC # SPEC AUTO: 5.5 X10(3)/MCL (ref 4.5–11.5)

## 2019-10-24 ENCOUNTER — HISTORICAL (OUTPATIENT)
Dept: ADMINISTRATIVE | Facility: HOSPITAL | Age: 79
End: 2019-10-24

## 2019-10-24 LAB
ABS NEUT (OLG): 2.72 X10(3)/MCL (ref 2.1–9.2)
ALBUMIN SERPL-MCNC: 3.5 GM/DL (ref 3.4–5)
ALBUMIN/GLOB SERPL: 1.1 {RATIO}
ALP SERPL-CCNC: 78 UNIT/L (ref 38–126)
ALT SERPL-CCNC: 35 UNIT/L (ref 12–78)
AST SERPL-CCNC: 20 UNIT/L (ref 15–37)
BASOPHILS # BLD AUTO: 0 X10(3)/MCL (ref 0–0.2)
BASOPHILS NFR BLD AUTO: 0.7 %
BILIRUB SERPL-MCNC: 0.6 MG/DL (ref 0.2–1)
BILIRUBIN DIRECT+TOT PNL SERPL-MCNC: 0.1 MG/DL (ref 0–0.2)
BILIRUBIN DIRECT+TOT PNL SERPL-MCNC: 0.5 MG/DL (ref 0–0.8)
BUN SERPL-MCNC: 16 MG/DL (ref 7–18)
CALCIUM SERPL-MCNC: 9.2 MG/DL (ref 8.5–10.1)
CHLORIDE SERPL-SCNC: 100 MMOL/L (ref 98–107)
CO2 SERPL-SCNC: 30 MMOL/L (ref 21–32)
CREAT SERPL-MCNC: 0.84 MG/DL (ref 0.55–1.02)
EOSINOPHIL # BLD AUTO: 0.1 X10(3)/MCL (ref 0–0.9)
EOSINOPHIL NFR BLD AUTO: 2.6 %
ERYTHROCYTE [DISTWIDTH] IN BLOOD BY AUTOMATED COUNT: 13.3 % (ref 11.5–17)
GLOBULIN SER-MCNC: 3.1 GM/DL (ref 2.4–3.5)
GLUCOSE SERPL-MCNC: 99 MG/DL (ref 74–106)
HCT VFR BLD AUTO: 38 % (ref 37–47)
HGB BLD-MCNC: 12.2 GM/DL (ref 12–16)
LYMPHOCYTES # BLD AUTO: 1.4 X10(3)/MCL (ref 0.6–4.6)
LYMPHOCYTES NFR BLD AUTO: 29.5 %
MCH RBC QN AUTO: 29.3 PG (ref 27–31)
MCHC RBC AUTO-ENTMCNC: 32.1 GM/DL (ref 33–36)
MCV RBC AUTO: 91.1 FL (ref 80–94)
MONOCYTES # BLD AUTO: 0.4 X10(3)/MCL (ref 0.1–1.3)
MONOCYTES NFR BLD AUTO: 8 %
NEUTROPHILS # BLD AUTO: 2.7 X10(3)/MCL (ref 2.1–9.2)
NEUTROPHILS NFR BLD AUTO: 59 %
PLATELET # BLD AUTO: 164 X10(3)/MCL (ref 130–400)
PMV BLD AUTO: 9.4 FL (ref 9.4–12.4)
POTASSIUM SERPL-SCNC: 4 MMOL/L (ref 3.5–5.1)
PROT SERPL-MCNC: 6.6 GM/DL (ref 6.4–8.2)
RBC # BLD AUTO: 4.17 X10(6)/MCL (ref 4.2–5.4)
SODIUM SERPL-SCNC: 137 MMOL/L (ref 136–145)
WBC # SPEC AUTO: 4.6 X10(3)/MCL (ref 4.5–11.5)

## 2019-10-30 ENCOUNTER — HISTORICAL (OUTPATIENT)
Dept: RADIATION THERAPY | Facility: HOSPITAL | Age: 79
End: 2019-10-30

## 2019-11-05 ENCOUNTER — HISTORICAL (OUTPATIENT)
Dept: INFUSION THERAPY | Facility: HOSPITAL | Age: 79
End: 2019-11-05

## 2019-11-05 LAB
ABS NEUT (OLG): 6.19 X10(3)/MCL (ref 2.1–9.2)
ANION GAP SERPL CALC-SCNC: 16 MMOL/L
BASOPHILS # BLD AUTO: 0.1 X10(3)/MCL (ref 0–0.2)
BASOPHILS NFR BLD AUTO: 0.6 %
BUN SERPL-MCNC: 12 MG/DL (ref 8–26)
CHLORIDE SERPL-SCNC: 98 MMOL/L (ref 98–109)
CREAT SERPL-MCNC: 0.8 MG/DL (ref 0.6–1.3)
EOSINOPHIL # BLD AUTO: 0.1 X10(3)/MCL (ref 0–0.9)
EOSINOPHIL NFR BLD AUTO: 1 %
ERYTHROCYTE [DISTWIDTH] IN BLOOD BY AUTOMATED COUNT: 14.9 % (ref 11.5–17)
GLUCOSE SERPL-MCNC: 105 MG/DL (ref 70–105)
HCT VFR BLD AUTO: 40.4 % (ref 37–47)
HCT VFR BLD CALC: 40 % (ref 38–51)
HGB BLD-MCNC: 12.8 GM/DL (ref 12–16)
HGB BLD-MCNC: 13.6 MG/DL (ref 12–17)
LYMPHOCYTES # BLD AUTO: 2.1 X10(3)/MCL (ref 0.6–4.6)
LYMPHOCYTES NFR BLD AUTO: 22.5 %
MCH RBC QN AUTO: 29.5 PG (ref 27–31)
MCHC RBC AUTO-ENTMCNC: 31.7 GM/DL (ref 33–36)
MCV RBC AUTO: 93.1 FL (ref 80–94)
MONOCYTES # BLD AUTO: 0.9 X10(3)/MCL (ref 0.1–1.3)
MONOCYTES NFR BLD AUTO: 9.1 %
NEUTROPHILS # BLD AUTO: 6.2 X10(3)/MCL (ref 2.1–9.2)
NEUTROPHILS NFR BLD AUTO: 65.7 %
PLATELET # BLD AUTO: 189 X10(3)/MCL (ref 130–400)
PMV BLD AUTO: 8.6 FL (ref 9.4–12.4)
POC IONIZED CALCIUM: 1.23 MMOL/L (ref 1.12–1.32)
POC TCO2: 30 MMOL/L (ref 24–29)
POTASSIUM BLD-SCNC: 4.4 MMOL/L (ref 3.5–4.9)
RBC # BLD AUTO: 4.34 X10(6)/MCL (ref 4.2–5.4)
SODIUM BLD-SCNC: 138 MMOL/L (ref 138–146)
WBC # SPEC AUTO: 9.4 X10(3)/MCL (ref 4.5–11.5)

## 2019-11-11 ENCOUNTER — HISTORICAL (OUTPATIENT)
Dept: RADIATION THERAPY | Facility: HOSPITAL | Age: 79
End: 2019-11-11

## 2019-11-13 ENCOUNTER — HISTORICAL (OUTPATIENT)
Dept: RADIATION THERAPY | Facility: HOSPITAL | Age: 79
End: 2019-11-13

## 2019-11-14 ENCOUNTER — HISTORICAL (OUTPATIENT)
Dept: ADMINISTRATIVE | Facility: HOSPITAL | Age: 79
End: 2019-11-14

## 2019-11-14 LAB
ABS NEUT (OLG): 2.3 X10(3)/MCL (ref 2.1–9.2)
ALBUMIN SERPL-MCNC: 3.4 GM/DL (ref 3.4–5)
ALBUMIN/GLOB SERPL: 1.1 RATIO (ref 1.1–2)
ALP SERPL-CCNC: 79 UNIT/L (ref 38–126)
ALT SERPL-CCNC: 31 UNIT/L (ref 12–78)
AST SERPL-CCNC: 23 UNIT/L (ref 15–37)
BASOPHILS # BLD AUTO: 0 X10(3)/MCL (ref 0–0.2)
BASOPHILS NFR BLD AUTO: 0.7 %
BILIRUB SERPL-MCNC: 0.3 MG/DL (ref 0.2–1)
BILIRUBIN DIRECT+TOT PNL SERPL-MCNC: 0.1 MG/DL (ref 0–0.5)
BILIRUBIN DIRECT+TOT PNL SERPL-MCNC: 0.2 MG/DL (ref 0–0.8)
BUN SERPL-MCNC: 13 MG/DL (ref 7–18)
CALCIUM SERPL-MCNC: 9.6 MG/DL (ref 8.5–10.1)
CHLORIDE SERPL-SCNC: 103 MMOL/L (ref 98–107)
CO2 SERPL-SCNC: 32 MMOL/L (ref 21–32)
CREAT SERPL-MCNC: 0.85 MG/DL (ref 0.55–1.02)
EOSINOPHIL # BLD AUTO: 0 X10(3)/MCL (ref 0–0.9)
EOSINOPHIL NFR BLD AUTO: 0.7 %
ERYTHROCYTE [DISTWIDTH] IN BLOOD BY AUTOMATED COUNT: 14.7 % (ref 11.5–17)
GLOBULIN SER-MCNC: 3 GM/DL (ref 2.4–3.5)
GLUCOSE SERPL-MCNC: 104 MG/DL (ref 74–106)
HCT VFR BLD AUTO: 36.3 % (ref 37–47)
HGB BLD-MCNC: 11.5 GM/DL (ref 12–16)
LYMPHOCYTES # BLD AUTO: 1.5 X10(3)/MCL (ref 0.6–4.6)
LYMPHOCYTES NFR BLD AUTO: 36.5 %
MCH RBC QN AUTO: 29.5 PG (ref 27–31)
MCHC RBC AUTO-ENTMCNC: 31.7 GM/DL (ref 33–36)
MCV RBC AUTO: 93.1 FL (ref 80–94)
MONOCYTES # BLD AUTO: 0.3 X10(3)/MCL (ref 0.1–1.3)
MONOCYTES NFR BLD AUTO: 7.3 %
NEUTROPHILS # BLD AUTO: 2.3 X10(3)/MCL (ref 2.1–9.2)
NEUTROPHILS NFR BLD AUTO: 54.6 %
PLATELET # BLD AUTO: 140 X10(3)/MCL (ref 130–400)
PMV BLD AUTO: 9.3 FL (ref 9.4–12.4)
POTASSIUM SERPL-SCNC: 4.2 MMOL/L (ref 3.5–5.1)
PROT SERPL-MCNC: 6.4 GM/DL (ref 6.4–8.2)
RBC # BLD AUTO: 3.9 X10(6)/MCL (ref 4.2–5.4)
SODIUM SERPL-SCNC: 139 MMOL/L (ref 136–145)
WBC # SPEC AUTO: 4.2 X10(3)/MCL (ref 4.5–11.5)

## 2019-11-18 ENCOUNTER — HISTORICAL (OUTPATIENT)
Dept: RADIATION THERAPY | Facility: HOSPITAL | Age: 79
End: 2019-11-18

## 2019-11-26 ENCOUNTER — HISTORICAL (OUTPATIENT)
Dept: INFUSION THERAPY | Facility: HOSPITAL | Age: 79
End: 2019-11-26

## 2019-11-26 LAB
ABS NEUT (OLG): 4.73 X10(3)/MCL (ref 2.1–9.2)
ANION GAP SERPL CALC-SCNC: 16 MMOL/L
BASOPHILS # BLD AUTO: 0 X10(3)/MCL (ref 0–0.2)
BASOPHILS NFR BLD AUTO: 0.3 %
BUN SERPL-MCNC: 14 MG/DL (ref 8–26)
CHLORIDE SERPL-SCNC: 100 MMOL/L (ref 98–109)
CREAT SERPL-MCNC: 0.9 MG/DL (ref 0.6–1.3)
EOSINOPHIL # BLD AUTO: 0.1 X10(3)/MCL (ref 0–0.9)
EOSINOPHIL NFR BLD AUTO: 0.9 %
ERYTHROCYTE [DISTWIDTH] IN BLOOD BY AUTOMATED COUNT: 16 % (ref 11.5–17)
GLUCOSE SERPL-MCNC: 87 MG/DL (ref 70–105)
HCT VFR BLD AUTO: 39.3 % (ref 37–47)
HCT VFR BLD CALC: 38 % (ref 38–51)
HGB BLD-MCNC: 12.5 GM/DL (ref 12–16)
HGB BLD-MCNC: 12.9 MG/DL (ref 12–17)
LYMPHOCYTES # BLD AUTO: 1.7 X10(3)/MCL (ref 0.6–4.6)
LYMPHOCYTES NFR BLD AUTO: 23.4 %
MCH RBC QN AUTO: 29.8 PG (ref 27–31)
MCHC RBC AUTO-ENTMCNC: 31.8 GM/DL (ref 33–36)
MCV RBC AUTO: 93.8 FL (ref 80–94)
MONOCYTES # BLD AUTO: 0.8 X10(3)/MCL (ref 0.1–1.3)
MONOCYTES NFR BLD AUTO: 10.3 %
NEUTROPHILS # BLD AUTO: 4.7 X10(3)/MCL (ref 2.1–9.2)
NEUTROPHILS NFR BLD AUTO: 63.9 %
PLATELET # BLD AUTO: 211 X10(3)/MCL (ref 130–400)
PMV BLD AUTO: 8.6 FL (ref 9.4–12.4)
POC IONIZED CALCIUM: 1.22 MMOL/L (ref 1.12–1.32)
POC TCO2: 27 MMOL/L (ref 24–29)
POTASSIUM BLD-SCNC: 4.2 MMOL/L (ref 3.5–4.9)
RBC # BLD AUTO: 4.19 X10(6)/MCL (ref 4.2–5.4)
SODIUM BLD-SCNC: 137 MMOL/L (ref 138–146)
WBC # SPEC AUTO: 7.4 X10(3)/MCL (ref 4.5–11.5)

## 2019-12-05 ENCOUNTER — HISTORICAL (OUTPATIENT)
Dept: RADIOLOGY | Facility: HOSPITAL | Age: 79
End: 2019-12-05

## 2019-12-05 LAB
ABS NEUT (OLG): 2.77 X10(3)/MCL (ref 2.1–9.2)
ALBUMIN SERPL-MCNC: 3.4 GM/DL (ref 3.4–5)
ALBUMIN/GLOB SERPL: 1 RATIO (ref 1.1–2)
ALP SERPL-CCNC: 80 UNIT/L (ref 38–126)
ALT SERPL-CCNC: 42 UNIT/L (ref 12–78)
AST SERPL-CCNC: 29 UNIT/L (ref 15–37)
BASOPHILS # BLD AUTO: 0 X10(3)/MCL (ref 0–0.2)
BASOPHILS NFR BLD AUTO: 0.4 %
BILIRUB SERPL-MCNC: 0.3 MG/DL (ref 0.2–1)
BILIRUBIN DIRECT+TOT PNL SERPL-MCNC: 0.1 MG/DL (ref 0–0.5)
BILIRUBIN DIRECT+TOT PNL SERPL-MCNC: 0.2 MG/DL (ref 0–0.8)
BUN SERPL-MCNC: 18 MG/DL (ref 7–18)
CALCIUM SERPL-MCNC: 9.1 MG/DL (ref 8.5–10.1)
CHLORIDE SERPL-SCNC: 102 MMOL/L (ref 98–107)
CO2 SERPL-SCNC: 29 MMOL/L (ref 21–32)
CREAT SERPL-MCNC: 0.94 MG/DL (ref 0.55–1.02)
EOSINOPHIL # BLD AUTO: 0 X10(3)/MCL (ref 0–0.9)
EOSINOPHIL NFR BLD AUTO: 0.9 %
ERYTHROCYTE [DISTWIDTH] IN BLOOD BY AUTOMATED COUNT: 15.5 % (ref 11.5–17)
GLOBULIN SER-MCNC: 3.5 GM/DL (ref 2.4–3.5)
GLUCOSE SERPL-MCNC: 111 MG/DL (ref 74–106)
HCT VFR BLD AUTO: 35.6 % (ref 37–47)
HGB BLD-MCNC: 11.6 GM/DL (ref 12–16)
LYMPHOCYTES # BLD AUTO: 1.4 X10(3)/MCL (ref 0.6–4.6)
LYMPHOCYTES NFR BLD AUTO: 31 %
MCH RBC QN AUTO: 30.4 PG (ref 27–31)
MCHC RBC AUTO-ENTMCNC: 32.6 GM/DL (ref 33–36)
MCV RBC AUTO: 93.2 FL (ref 80–94)
MONOCYTES # BLD AUTO: 0.4 X10(3)/MCL (ref 0.1–1.3)
MONOCYTES NFR BLD AUTO: 7.6 %
NEUTROPHILS # BLD AUTO: 2.8 X10(3)/MCL (ref 2.1–9.2)
NEUTROPHILS NFR BLD AUTO: 59.9 %
PLATELET # BLD AUTO: 124 X10(3)/MCL (ref 130–400)
PMV BLD AUTO: 9.6 FL (ref 9.4–12.4)
POTASSIUM SERPL-SCNC: 3.9 MMOL/L (ref 3.5–5.1)
PROT SERPL-MCNC: 6.9 GM/DL (ref 6.4–8.2)
RBC # BLD AUTO: 3.82 X10(6)/MCL (ref 4.2–5.4)
SODIUM SERPL-SCNC: 137 MMOL/L (ref 136–145)
WBC # SPEC AUTO: 4.6 X10(3)/MCL (ref 4.5–11.5)

## 2019-12-11 ENCOUNTER — HISTORICAL (OUTPATIENT)
Dept: HEMATOLOGY/ONCOLOGY | Facility: CLINIC | Age: 79
End: 2019-12-11

## 2019-12-11 LAB
ABS NEUT (OLG): 1.39 X10(3)/MCL (ref 2.1–9.2)
ALBUMIN SERPL-MCNC: 3.5 GM/DL (ref 3.4–5)
ALBUMIN/GLOB SERPL: 1.1 {RATIO}
ALP SERPL-CCNC: 79 UNIT/L (ref 38–126)
ALT SERPL-CCNC: 31 UNIT/L (ref 12–78)
ANISOCYTOSIS BLD QL SMEAR: 1
AST SERPL-CCNC: 22 UNIT/L (ref 15–37)
BASOPHILS # BLD AUTO: 0 X10(3)/MCL (ref 0–0.2)
BASOPHILS NFR BLD AUTO: 0.2 %
BASOPHILS NFR BLD MANUAL: 2 % (ref 0–2)
BILIRUB SERPL-MCNC: 0.6 MG/DL (ref 0.2–1)
BILIRUBIN DIRECT+TOT PNL SERPL-MCNC: 0.1 MG/DL (ref 0–0.2)
BILIRUBIN DIRECT+TOT PNL SERPL-MCNC: 0.5 MG/DL (ref 0–0.8)
BUN SERPL-MCNC: 13 MG/DL (ref 7–18)
CALCIUM SERPL-MCNC: 8.9 MG/DL (ref 8.5–10.1)
CHLORIDE SERPL-SCNC: 102 MMOL/L (ref 98–107)
CO2 SERPL-SCNC: 28 MMOL/L (ref 21–32)
CREAT SERPL-MCNC: 0.91 MG/DL (ref 0.55–1.02)
DACRYOCYTES BLD QL SMEAR: 1 /MCL (ref 0–1)
EOSINOPHIL # BLD AUTO: 0 X10(3)/MCL (ref 0–0.9)
EOSINOPHIL NFR BLD AUTO: 1.2 %
ERYTHROCYTE [DISTWIDTH] IN BLOOD BY AUTOMATED COUNT: 16.8 % (ref 11.5–17)
GLOBULIN SER-MCNC: 3.2 GM/DL (ref 2.4–3.5)
GLUCOSE SERPL-MCNC: 88 MG/DL (ref 74–106)
HCT VFR BLD AUTO: 37.4 % (ref 37–47)
HGB BLD-MCNC: 12.1 GM/DL (ref 12–16)
HYPOCHROMIA BLD QL SMEAR: 0
LYMPHOCYTES # BLD AUTO: 1.7 X10(3)/MCL (ref 0.6–4.6)
LYMPHOCYTES NFR BLD AUTO: 42.5 %
LYMPHOCYTES NFR BLD MANUAL: 31 % (ref 13–40)
MACROCYTES BLD QL SMEAR: 1
MCH RBC QN AUTO: 30.9 PG (ref 27–31)
MCHC RBC AUTO-ENTMCNC: 32.4 GM/DL (ref 33–36)
MCV RBC AUTO: 95.4 FL (ref 80–94)
MICROCYTES BLD QL SMEAR: 0
MONOCYTES # BLD AUTO: 0.8 X10(3)/MCL (ref 0.1–1.3)
MONOCYTES NFR BLD AUTO: 20.8 %
MONOCYTES NFR BLD MANUAL: 18 % (ref 2–11)
NEUTROPHILS # BLD AUTO: 1.4 X10(3)/MCL (ref 2.1–9.2)
NEUTROPHILS NFR BLD AUTO: 34.1 %
NEUTROPHILS NFR BLD MANUAL: 50 % (ref 47–80)
PLATELET # BLD AUTO: 176 X10(3)/MCL (ref 130–400)
PLATELET # BLD EST: NORMAL 10*3/UL
PMV BLD AUTO: 8.3 FL (ref 9.4–12.4)
POIKILOCYTOSIS BLD QL SMEAR: 1
POLYCHROMASIA BLD QL SMEAR: 1
POTASSIUM SERPL-SCNC: 4 MMOL/L (ref 3.5–5.1)
PROT SERPL-MCNC: 6.7 GM/DL (ref 6.4–8.2)
RBC # BLD AUTO: 3.92 X10(6)/MCL (ref 4.2–5.4)
SCHISTOCYTES BLD QL AUTO: 0
SODIUM SERPL-SCNC: 137 MMOL/L (ref 136–145)
SPHEROCYTES BLD QL SMEAR: 0
TARGETS BLD QL SMEAR: 0
WBC # SPEC AUTO: 4.1 X10(3)/MCL (ref 4.5–11.5)

## 2019-12-27 ENCOUNTER — HISTORICAL (OUTPATIENT)
Dept: ADMINISTRATIVE | Facility: HOSPITAL | Age: 79
End: 2019-12-27

## 2019-12-27 LAB
ABS NEUT (OLG): 6.34 X10(3)/MCL (ref 2.1–9.2)
ALBUMIN SERPL-MCNC: 3.3 GM/DL (ref 3.4–5)
ALP SERPL-CCNC: 80 UNIT/L (ref 38–126)
ALT SERPL-CCNC: 26 UNIT/L (ref 12–78)
ANION GAP SERPL CALC-SCNC: 15 MMOL/L
AST SERPL-CCNC: 13 UNIT/L (ref 15–37)
BASOPHILS # BLD AUTO: 0 X10(3)/MCL (ref 0–0.2)
BASOPHILS NFR BLD AUTO: 0.3 %
BILIRUB SERPL-MCNC: 0.7 MG/DL (ref 0.2–1)
BILIRUBIN DIRECT+TOT PNL SERPL-MCNC: 0.1 MG/DL (ref 0–0.2)
BILIRUBIN DIRECT+TOT PNL SERPL-MCNC: 0.6 MG/DL (ref 0–0.8)
BUN SERPL-MCNC: 17 MG/DL (ref 8–26)
CANCER AG125 SERPL-ACNC: 12.4 IU/ML (ref 1.5–35)
CHLORIDE SERPL-SCNC: 102 MMOL/L (ref 98–109)
CREAT SERPL-MCNC: 0.8 MG/DL (ref 0.6–1.3)
EOSINOPHIL # BLD AUTO: 0.1 X10(3)/MCL (ref 0–0.9)
EOSINOPHIL NFR BLD AUTO: 1.3 %
ERYTHROCYTE [DISTWIDTH] IN BLOOD BY AUTOMATED COUNT: 17.4 % (ref 11.5–17)
GLUCOSE SERPL-MCNC: 102 MG/DL (ref 70–105)
HCT VFR BLD AUTO: 36.5 % (ref 37–47)
HCT VFR BLD CALC: 36 % (ref 38–51)
HGB BLD-MCNC: 11.9 GM/DL (ref 12–16)
HGB BLD-MCNC: 12.2 MG/DL (ref 12–17)
LIVER PROFILE INTERP: ABNORMAL
LYMPHOCYTES # BLD AUTO: 2 X10(3)/MCL (ref 0.6–4.6)
LYMPHOCYTES NFR BLD AUTO: 21.7 %
MCH RBC QN AUTO: 31.7 PG (ref 27–31)
MCHC RBC AUTO-ENTMCNC: 32.6 GM/DL (ref 33–36)
MCV RBC AUTO: 97.3 FL (ref 80–94)
MONOCYTES # BLD AUTO: 0.7 X10(3)/MCL (ref 0.1–1.3)
MONOCYTES NFR BLD AUTO: 7.6 %
NEUTROPHILS # BLD AUTO: 6.3 X10(3)/MCL (ref 2.1–9.2)
NEUTROPHILS NFR BLD AUTO: 68.6 %
PLATELET # BLD AUTO: 168 X10(3)/MCL (ref 130–400)
PMV BLD AUTO: 9 FL (ref 9.4–12.4)
POC IONIZED CALCIUM: 1.22 MMOL/L (ref 1.12–1.32)
POC TCO2: 27 MMOL/L (ref 24–29)
POTASSIUM BLD-SCNC: 3.9 MMOL/L (ref 3.5–4.9)
PROT SERPL-MCNC: 6.3 GM/DL (ref 6.4–8.2)
RBC # BLD AUTO: 3.75 X10(6)/MCL (ref 4.2–5.4)
SODIUM BLD-SCNC: 139 MMOL/L (ref 138–146)
WBC # SPEC AUTO: 9.2 X10(3)/MCL (ref 4.5–11.5)

## 2020-01-24 ENCOUNTER — HISTORICAL (OUTPATIENT)
Dept: INFUSION THERAPY | Facility: HOSPITAL | Age: 80
End: 2020-01-24

## 2020-01-24 LAB
ABS NEUT (OLG): 3.37 X10(3)/MCL (ref 2.1–9.2)
ALBUMIN SERPL-MCNC: 3.4 GM/DL (ref 3.4–5)
ALP SERPL-CCNC: 66 UNIT/L (ref 38–126)
ALT SERPL-CCNC: 28 UNIT/L (ref 12–78)
ANION GAP SERPL CALC-SCNC: 15 MMOL/L
AST SERPL-CCNC: 22 UNIT/L (ref 15–37)
BASOPHILS # BLD AUTO: 0 X10(3)/MCL (ref 0–0.2)
BASOPHILS NFR BLD AUTO: 0.5 %
BILIRUB SERPL-MCNC: 0.7 MG/DL (ref 0.2–1)
BILIRUBIN DIRECT+TOT PNL SERPL-MCNC: 0.1 MG/DL (ref 0–0.2)
BILIRUBIN DIRECT+TOT PNL SERPL-MCNC: 0.6 MG/DL (ref 0–0.8)
BUN SERPL-MCNC: 9 MG/DL (ref 8–26)
CHLORIDE SERPL-SCNC: 100 MMOL/L (ref 98–109)
CREAT SERPL-MCNC: 0.9 MG/DL (ref 0.6–1.3)
EOSINOPHIL # BLD AUTO: 0.1 X10(3)/MCL (ref 0–0.9)
EOSINOPHIL NFR BLD AUTO: 2.5 %
ERYTHROCYTE [DISTWIDTH] IN BLOOD BY AUTOMATED COUNT: 13.9 % (ref 11.5–17)
GLUCOSE SERPL-MCNC: 97 MG/DL (ref 70–105)
HCT VFR BLD AUTO: 39.1 % (ref 37–47)
HCT VFR BLD CALC: 38 % (ref 38–51)
HGB BLD-MCNC: 12.4 GM/DL (ref 12–16)
HGB BLD-MCNC: 12.9 MG/DL (ref 12–17)
LIVER PROFILE INTERP: NORMAL
LYMPHOCYTES # BLD AUTO: 1.5 X10(3)/MCL (ref 0.6–4.6)
LYMPHOCYTES NFR BLD AUTO: 26.6 %
MCH RBC QN AUTO: 31 PG (ref 27–31)
MCHC RBC AUTO-ENTMCNC: 31.7 GM/DL (ref 33–36)
MCV RBC AUTO: 97.8 FL (ref 80–94)
MONOCYTES # BLD AUTO: 0.5 X10(3)/MCL (ref 0.1–1.3)
MONOCYTES NFR BLD AUTO: 9 %
NEUTROPHILS # BLD AUTO: 3.4 X10(3)/MCL (ref 2.1–9.2)
NEUTROPHILS NFR BLD AUTO: 61 %
PLATELET # BLD AUTO: 159 X10(3)/MCL (ref 130–400)
PMV BLD AUTO: 9.2 FL (ref 9.4–12.4)
POC IONIZED CALCIUM: 1.25 MMOL/L (ref 1.12–1.32)
POC TCO2: 29 MMOL/L (ref 24–29)
POTASSIUM BLD-SCNC: 3.9 MMOL/L (ref 3.5–4.9)
PROT SERPL-MCNC: 6.6 GM/DL (ref 6.4–8.2)
RBC # BLD AUTO: 4 X10(6)/MCL (ref 4.2–5.4)
SODIUM BLD-SCNC: 139 MMOL/L (ref 138–146)
WBC # SPEC AUTO: 5.5 X10(3)/MCL (ref 4.5–11.5)

## 2020-03-04 ENCOUNTER — HISTORICAL (OUTPATIENT)
Dept: RADIATION THERAPY | Facility: HOSPITAL | Age: 80
End: 2020-03-04

## 2020-04-29 ENCOUNTER — HISTORICAL (OUTPATIENT)
Dept: INFUSION THERAPY | Facility: HOSPITAL | Age: 80
End: 2020-04-29

## 2020-04-29 LAB
ABS NEUT (OLG): 4.85 X10(3)/MCL (ref 2.1–9.2)
ALBUMIN SERPL-MCNC: 3.6 GM/DL (ref 3.4–4.8)
ALBUMIN/GLOB SERPL: 1.2 RATIO (ref 1.1–2)
ALP SERPL-CCNC: 75 UNIT/L (ref 40–150)
ALT SERPL-CCNC: 12 UNIT/L (ref 0–55)
AST SERPL-CCNC: 18 UNIT/L (ref 5–34)
BASOPHILS # BLD AUTO: 0 X10(3)/MCL (ref 0–0.2)
BASOPHILS NFR BLD AUTO: 0.6 %
BILIRUB SERPL-MCNC: 0.3 MG/DL
BILIRUBIN DIRECT+TOT PNL SERPL-MCNC: 0.1 MG/DL (ref 0–0.8)
BILIRUBIN DIRECT+TOT PNL SERPL-MCNC: 0.2 MG/DL (ref 0–0.5)
BUN SERPL-MCNC: 10 MG/DL (ref 9.8–20.1)
CALCIUM SERPL-MCNC: 9.1 MG/DL (ref 8.4–10.2)
CANCER AG125 SERPL-ACNC: 24.7 UNIT/ML (ref 0–35)
CHLORIDE SERPL-SCNC: 105 MMOL/L (ref 98–107)
CO2 SERPL-SCNC: 28 MMOL/L (ref 23–31)
CREAT SERPL-MCNC: 0.82 MG/DL (ref 0.55–1.02)
EOSINOPHIL # BLD AUTO: 0.3 X10(3)/MCL (ref 0–0.9)
EOSINOPHIL NFR BLD AUTO: 3.6 %
ERYTHROCYTE [DISTWIDTH] IN BLOOD BY AUTOMATED COUNT: 12.6 % (ref 11.5–17)
GLOBULIN SER-MCNC: 3.1 GM/DL (ref 2.4–3.5)
GLUCOSE SERPL-MCNC: 93 MG/DL (ref 82–115)
HCT VFR BLD AUTO: 40.2 % (ref 37–47)
HGB BLD-MCNC: 12.8 GM/DL (ref 12–16)
LYMPHOCYTES # BLD AUTO: 2.1 X10(3)/MCL (ref 0.6–4.6)
LYMPHOCYTES NFR BLD AUTO: 26.3 %
MCH RBC QN AUTO: 30.5 PG (ref 27–31)
MCHC RBC AUTO-ENTMCNC: 31.8 GM/DL (ref 33–36)
MCV RBC AUTO: 95.7 FL (ref 80–94)
MONOCYTES # BLD AUTO: 0.7 X10(3)/MCL (ref 0.1–1.3)
MONOCYTES NFR BLD AUTO: 8.6 %
NEUTROPHILS # BLD AUTO: 4.8 X10(3)/MCL (ref 2.1–9.2)
NEUTROPHILS NFR BLD AUTO: 60.7 %
PLATELET # BLD AUTO: 183 X10(3)/MCL (ref 130–400)
PMV BLD AUTO: 9.2 FL (ref 9.4–12.4)
POTASSIUM SERPL-SCNC: 4 MMOL/L (ref 3.5–5.1)
PROT SERPL-MCNC: 6.7 GM/DL (ref 5.8–7.6)
RBC # BLD AUTO: 4.2 X10(6)/MCL (ref 4.2–5.4)
SODIUM SERPL-SCNC: 140 MMOL/L (ref 136–145)
WBC # SPEC AUTO: 8 X10(3)/MCL (ref 4.5–11.5)

## 2020-07-10 ENCOUNTER — HISTORICAL (OUTPATIENT)
Dept: INFUSION THERAPY | Facility: HOSPITAL | Age: 80
End: 2020-07-10

## 2020-08-04 ENCOUNTER — HISTORICAL (OUTPATIENT)
Dept: RADIOLOGY | Facility: HOSPITAL | Age: 80
End: 2020-08-04

## 2020-08-04 ENCOUNTER — HISTORICAL (OUTPATIENT)
Dept: HEMATOLOGY/ONCOLOGY | Facility: CLINIC | Age: 80
End: 2020-08-04

## 2020-08-04 LAB
ABS NEUT (OLG): 4.7 X10(3)/MCL (ref 2.1–9.2)
ALBUMIN SERPL-MCNC: 3.7 GM/DL (ref 3.4–4.8)
ALBUMIN/GLOB SERPL: 1.3 RATIO (ref 1.1–2)
ALP SERPL-CCNC: 69 UNIT/L (ref 40–150)
ALT SERPL-CCNC: 19 UNIT/L (ref 0–55)
AST SERPL-CCNC: 19 UNIT/L (ref 5–34)
BASOPHILS # BLD AUTO: 0 X10(3)/MCL (ref 0–0.2)
BASOPHILS NFR BLD AUTO: 0.7 %
BILIRUB SERPL-MCNC: 0.4 MG/DL
BILIRUBIN DIRECT+TOT PNL SERPL-MCNC: 0.2 MG/DL (ref 0–0.5)
BILIRUBIN DIRECT+TOT PNL SERPL-MCNC: 0.2 MG/DL (ref 0–0.8)
BUN SERPL-MCNC: 13.3 MG/DL (ref 9.8–20.1)
CALCIUM SERPL-MCNC: 9 MG/DL (ref 8.4–10.2)
CANCER AG125 SERPL-ACNC: 26.5 UNIT/ML (ref 0–35)
CHLORIDE SERPL-SCNC: 101 MMOL/L (ref 98–107)
CO2 SERPL-SCNC: 30 MMOL/L (ref 23–31)
CREAT SERPL-MCNC: 0.83 MG/DL (ref 0.55–1.02)
EOSINOPHIL # BLD AUTO: 0.2 X10(3)/MCL (ref 0–0.9)
EOSINOPHIL NFR BLD AUTO: 2.2 %
ERYTHROCYTE [DISTWIDTH] IN BLOOD BY AUTOMATED COUNT: 12.8 % (ref 11.5–17)
GLOBULIN SER-MCNC: 2.9 GM/DL (ref 2.4–3.5)
GLUCOSE SERPL-MCNC: 90 MG/DL (ref 82–115)
HCT VFR BLD AUTO: 41.5 % (ref 37–47)
HGB BLD-MCNC: 13.3 GM/DL (ref 12–16)
LYMPHOCYTES # BLD AUTO: 1.7 X10(3)/MCL (ref 0.6–4.6)
LYMPHOCYTES NFR BLD AUTO: 24.2 %
MCH RBC QN AUTO: 30.7 PG (ref 27–31)
MCHC RBC AUTO-ENTMCNC: 32 GM/DL (ref 33–36)
MCV RBC AUTO: 95.8 FL (ref 80–94)
MONOCYTES # BLD AUTO: 0.5 X10(3)/MCL (ref 0.1–1.3)
MONOCYTES NFR BLD AUTO: 7.2 %
NEUTROPHILS # BLD AUTO: 4.7 X10(3)/MCL (ref 2.1–9.2)
NEUTROPHILS NFR BLD AUTO: 65.4 %
PLATELET # BLD AUTO: 184 X10(3)/MCL (ref 130–400)
PMV BLD AUTO: 9.1 FL (ref 9.4–12.4)
POC CREATININE: 0.8 MG/DL (ref 0.6–1.3)
POTASSIUM SERPL-SCNC: 4.4 MMOL/L (ref 3.5–5.1)
PROT SERPL-MCNC: 6.6 GM/DL (ref 5.8–7.6)
RBC # BLD AUTO: 4.33 X10(6)/MCL (ref 4.2–5.4)
SODIUM SERPL-SCNC: 139 MMOL/L (ref 136–145)
WBC # SPEC AUTO: 7.2 X10(3)/MCL (ref 4.5–11.5)

## 2020-09-04 ENCOUNTER — HISTORICAL (OUTPATIENT)
Dept: INFUSION THERAPY | Facility: HOSPITAL | Age: 80
End: 2020-09-04

## 2020-10-30 ENCOUNTER — HISTORICAL (OUTPATIENT)
Dept: INFUSION THERAPY | Facility: HOSPITAL | Age: 80
End: 2020-10-30

## 2020-10-30 LAB
ABS NEUT (OLG): 5.78 X10(3)/MCL (ref 2.1–9.2)
ALBUMIN SERPL-MCNC: 4.1 GM/DL (ref 3.4–5)
ALBUMIN/GLOB SERPL: 1.3 RATIO (ref 1.1–2)
ALP SERPL-CCNC: 75 UNIT/L (ref 40–150)
ALT SERPL-CCNC: 17 UNIT/L (ref 0–55)
AST SERPL-CCNC: 18 UNIT/L (ref 5–34)
BASOPHILS # BLD AUTO: 0 X10(3)/MCL (ref 0–0.2)
BASOPHILS NFR BLD AUTO: 0.4 %
BILIRUB SERPL-MCNC: 0.4 MG/DL
BILIRUBIN DIRECT+TOT PNL SERPL-MCNC: 0.1 MG/DL (ref 0–0.5)
BILIRUBIN DIRECT+TOT PNL SERPL-MCNC: 0.3 MG/DL (ref 0–0.8)
BUN SERPL-MCNC: 16 MG/DL (ref 9.8–20.1)
CALCIUM SERPL-MCNC: 9.8 MG/DL (ref 8.4–10.2)
CANCER AG125 SERPL-ACNC: 27.5 UNIT/ML (ref 0–35)
CHLORIDE SERPL-SCNC: 103 MMOL/L (ref 98–107)
CO2 SERPL-SCNC: 31 MMOL/L (ref 23–31)
CREAT SERPL-MCNC: 0.94 MG/DL (ref 0.55–1.02)
EOSINOPHIL # BLD AUTO: 0.1 X10(3)/MCL (ref 0–0.9)
EOSINOPHIL NFR BLD AUTO: 1.5 %
ERYTHROCYTE [DISTWIDTH] IN BLOOD BY AUTOMATED COUNT: 13 % (ref 11.5–17)
GLOBULIN SER-MCNC: 3.2 GM/DL (ref 2.4–3.5)
GLUCOSE SERPL-MCNC: 82 MG/DL (ref 82–115)
HCT VFR BLD AUTO: 43.2 % (ref 37–47)
HGB BLD-MCNC: 14 GM/DL (ref 12–16)
LYMPHOCYTES # BLD AUTO: 1.8 X10(3)/MCL (ref 0.6–4.6)
LYMPHOCYTES NFR BLD AUTO: 21.9 %
MCH RBC QN AUTO: 31 PG (ref 27–31)
MCHC RBC AUTO-ENTMCNC: 32.4 GM/DL (ref 33–36)
MCV RBC AUTO: 95.6 FL (ref 80–94)
MONOCYTES # BLD AUTO: 0.6 X10(3)/MCL (ref 0.1–1.3)
MONOCYTES NFR BLD AUTO: 7.5 %
NEUTROPHILS # BLD AUTO: 5.8 X10(3)/MCL (ref 2.1–9.2)
NEUTROPHILS NFR BLD AUTO: 68.3 %
PLATELET # BLD AUTO: 182 X10(3)/MCL (ref 130–400)
PMV BLD AUTO: 8.8 FL (ref 9.4–12.4)
POTASSIUM SERPL-SCNC: 4.9 MMOL/L (ref 3.5–5.1)
PROT SERPL-MCNC: 7.3 GM/DL (ref 5.8–7.6)
RBC # BLD AUTO: 4.52 X10(6)/MCL (ref 4.2–5.4)
SODIUM SERPL-SCNC: 143 MMOL/L (ref 136–145)
WBC # SPEC AUTO: 8.4 X10(3)/MCL (ref 4.5–11.5)

## 2021-01-08 ENCOUNTER — HISTORICAL (OUTPATIENT)
Dept: INFUSION THERAPY | Facility: HOSPITAL | Age: 81
End: 2021-01-08

## 2021-02-02 ENCOUNTER — HISTORICAL (OUTPATIENT)
Dept: HEMATOLOGY/ONCOLOGY | Facility: CLINIC | Age: 81
End: 2021-02-02

## 2021-02-02 LAB
ABS NEUT (OLG): 6.08 X10(3)/MCL (ref 2.1–9.2)
ALBUMIN SERPL-MCNC: 4 GM/DL (ref 3.4–4.8)
ALBUMIN/GLOB SERPL: 1.3 RATIO (ref 1.1–2)
ALP SERPL-CCNC: 78 UNIT/L (ref 40–150)
ALT SERPL-CCNC: 21 UNIT/L (ref 0–55)
AST SERPL-CCNC: 16 UNIT/L (ref 5–34)
BASOPHILS # BLD AUTO: 0 X10(3)/MCL (ref 0–0.2)
BASOPHILS NFR BLD AUTO: 0.5 %
BILIRUB SERPL-MCNC: 0.3 MG/DL
BILIRUBIN DIRECT+TOT PNL SERPL-MCNC: 0.1 MG/DL (ref 0–0.8)
BILIRUBIN DIRECT+TOT PNL SERPL-MCNC: 0.2 MG/DL (ref 0–0.5)
BUN SERPL-MCNC: 18 MG/DL (ref 9.8–20.1)
CALCIUM SERPL-MCNC: 9.4 MG/DL (ref 8.4–10.2)
CANCER AG125 SERPL-ACNC: 25.6 UNIT/ML (ref 0–35)
CHLORIDE SERPL-SCNC: 101 MMOL/L (ref 98–107)
CO2 SERPL-SCNC: 31 MMOL/L (ref 23–31)
CREAT SERPL-MCNC: 0.81 MG/DL (ref 0.55–1.02)
EOSINOPHIL # BLD AUTO: 0.3 X10(3)/MCL (ref 0–0.9)
EOSINOPHIL NFR BLD AUTO: 3.5 %
ERYTHROCYTE [DISTWIDTH] IN BLOOD BY AUTOMATED COUNT: 12.4 % (ref 11.5–17)
GLOBULIN SER-MCNC: 3.1 GM/DL (ref 2.4–3.5)
GLUCOSE SERPL-MCNC: 108 MG/DL (ref 82–115)
HCT VFR BLD AUTO: 42.2 % (ref 37–47)
HGB BLD-MCNC: 13.6 GM/DL (ref 12–16)
LYMPHOCYTES # BLD AUTO: 2.4 X10(3)/MCL (ref 0.6–4.6)
LYMPHOCYTES NFR BLD AUTO: 24.8 %
MCH RBC QN AUTO: 31 PG (ref 27–31)
MCHC RBC AUTO-ENTMCNC: 32.2 GM/DL (ref 33–36)
MCV RBC AUTO: 96.1 FL (ref 80–94)
MONOCYTES # BLD AUTO: 0.8 X10(3)/MCL (ref 0.1–1.3)
MONOCYTES NFR BLD AUTO: 8 %
NEUTROPHILS # BLD AUTO: 6.1 X10(3)/MCL (ref 2.1–9.2)
NEUTROPHILS NFR BLD AUTO: 62.8 %
PLATELET # BLD AUTO: 194 X10(3)/MCL (ref 130–400)
PMV BLD AUTO: 8.9 FL (ref 9.4–12.4)
POTASSIUM SERPL-SCNC: 4.4 MMOL/L (ref 3.5–5.1)
PROT SERPL-MCNC: 7.1 GM/DL (ref 5.8–7.6)
RBC # BLD AUTO: 4.39 X10(6)/MCL (ref 4.2–5.4)
SODIUM SERPL-SCNC: 141 MMOL/L (ref 136–145)
WBC # SPEC AUTO: 9.7 X10(3)/MCL (ref 4.5–11.5)

## 2021-03-05 ENCOUNTER — HISTORICAL (OUTPATIENT)
Dept: INFUSION THERAPY | Facility: HOSPITAL | Age: 81
End: 2021-03-05

## 2021-04-30 ENCOUNTER — HISTORICAL (OUTPATIENT)
Dept: INFUSION THERAPY | Facility: HOSPITAL | Age: 81
End: 2021-04-30

## 2021-05-07 ENCOUNTER — HISTORICAL (OUTPATIENT)
Dept: HEMATOLOGY/ONCOLOGY | Facility: CLINIC | Age: 81
End: 2021-05-07

## 2021-05-07 LAB
ABS NEUT (OLG): 4.43 X10(3)/MCL (ref 2.1–9.2)
ALBUMIN SERPL-MCNC: 3.7 GM/DL (ref 3.4–4.8)
ALBUMIN/GLOB SERPL: 1.3 RATIO (ref 1.1–2)
ALP SERPL-CCNC: 73 UNIT/L (ref 40–150)
ALT SERPL-CCNC: 20 UNIT/L (ref 0–55)
AST SERPL-CCNC: 19 UNIT/L (ref 5–34)
BASOPHILS # BLD AUTO: 0 X10(3)/MCL (ref 0–0.2)
BASOPHILS NFR BLD AUTO: 0.6 %
BILIRUB SERPL-MCNC: 0.4 MG/DL
BILIRUBIN DIRECT+TOT PNL SERPL-MCNC: 0.1 MG/DL (ref 0–0.5)
BILIRUBIN DIRECT+TOT PNL SERPL-MCNC: 0.3 MG/DL (ref 0–0.8)
BUN SERPL-MCNC: 20.5 MG/DL (ref 9.8–20.1)
CALCIUM SERPL-MCNC: 9.5 MG/DL (ref 8.4–10.2)
CANCER AG125 SERPL-ACNC: 25.2 UNIT/ML (ref 0–35)
CHLORIDE SERPL-SCNC: 106 MMOL/L (ref 98–107)
CO2 SERPL-SCNC: 29 MMOL/L (ref 23–31)
CREAT SERPL-MCNC: 0.81 MG/DL (ref 0.55–1.02)
EOSINOPHIL # BLD AUTO: 0.2 X10(3)/MCL (ref 0–0.9)
EOSINOPHIL NFR BLD AUTO: 3.3 %
ERYTHROCYTE [DISTWIDTH] IN BLOOD BY AUTOMATED COUNT: 12.9 % (ref 11.5–17)
GLOBULIN SER-MCNC: 2.9 GM/DL (ref 2.4–3.5)
GLUCOSE SERPL-MCNC: 96 MG/DL (ref 82–115)
HCT VFR BLD AUTO: 38.6 % (ref 37–47)
HGB BLD-MCNC: 12.4 GM/DL (ref 12–16)
LYMPHOCYTES # BLD AUTO: 1.8 X10(3)/MCL (ref 0.6–4.6)
LYMPHOCYTES NFR BLD AUTO: 26.2 %
MCH RBC QN AUTO: 31.1 PG (ref 27–31)
MCHC RBC AUTO-ENTMCNC: 32.1 GM/DL (ref 33–36)
MCV RBC AUTO: 96.7 FL (ref 80–94)
MONOCYTES # BLD AUTO: 0.5 X10(3)/MCL (ref 0.1–1.3)
MONOCYTES NFR BLD AUTO: 7.2 %
NEUTROPHILS # BLD AUTO: 4.4 X10(3)/MCL (ref 2.1–9.2)
NEUTROPHILS NFR BLD AUTO: 62.6 %
PLATELET # BLD AUTO: 170 X10(3)/MCL (ref 130–400)
PMV BLD AUTO: 9 FL (ref 9.4–12.4)
POTASSIUM SERPL-SCNC: 4.2 MMOL/L (ref 3.5–5.1)
PROT SERPL-MCNC: 6.6 GM/DL (ref 5.8–7.6)
RBC # BLD AUTO: 3.99 X10(6)/MCL (ref 4.2–5.4)
SODIUM SERPL-SCNC: 143 MMOL/L (ref 136–145)
WBC # SPEC AUTO: 7.1 X10(3)/MCL (ref 4.5–11.5)

## 2021-06-25 ENCOUNTER — HISTORICAL (OUTPATIENT)
Dept: INFUSION THERAPY | Facility: HOSPITAL | Age: 81
End: 2021-06-25

## 2021-08-09 ENCOUNTER — HISTORICAL (OUTPATIENT)
Dept: HEMATOLOGY/ONCOLOGY | Facility: CLINIC | Age: 81
End: 2021-08-09

## 2021-08-09 LAB
ABS NEUT (OLG): 5.39 X10(3)/MCL (ref 2.1–9.2)
ALBUMIN SERPL-MCNC: 3.9 GM/DL (ref 3.4–4.8)
ALBUMIN/GLOB SERPL: 1.3 RATIO (ref 1.1–2)
ALP SERPL-CCNC: 68 UNIT/L (ref 40–150)
ALT SERPL-CCNC: 20 UNIT/L (ref 0–55)
AST SERPL-CCNC: 19 UNIT/L (ref 5–34)
BASOPHILS # BLD AUTO: 0 X10(3)/MCL (ref 0–0.2)
BASOPHILS NFR BLD AUTO: 0.3 %
BILIRUB SERPL-MCNC: 0.4 MG/DL
BILIRUBIN DIRECT+TOT PNL SERPL-MCNC: 0.2 MG/DL (ref 0–0.5)
BILIRUBIN DIRECT+TOT PNL SERPL-MCNC: 0.2 MG/DL (ref 0–0.8)
BUN SERPL-MCNC: 16.3 MG/DL (ref 9.8–20.1)
CALCIUM SERPL-MCNC: 9.2 MG/DL (ref 8.4–10.2)
CANCER AG125 SERPL-ACNC: 25.1 UNIT/ML (ref 0–35)
CHLORIDE SERPL-SCNC: 102 MMOL/L (ref 98–107)
CO2 SERPL-SCNC: 26 MMOL/L (ref 23–31)
CREAT SERPL-MCNC: 0.89 MG/DL (ref 0.55–1.02)
EOSINOPHIL # BLD AUTO: 0.2 X10(3)/MCL (ref 0–0.9)
EOSINOPHIL NFR BLD AUTO: 2.7 %
ERYTHROCYTE [DISTWIDTH] IN BLOOD BY AUTOMATED COUNT: 12.8 % (ref 11.5–17)
GLOBULIN SER-MCNC: 2.9 GM/DL (ref 2.4–3.5)
GLUCOSE SERPL-MCNC: 99 MG/DL (ref 82–115)
HCT VFR BLD AUTO: 39.5 % (ref 37–47)
HGB BLD-MCNC: 12.7 GM/DL (ref 12–16)
LYMPHOCYTES # BLD AUTO: 2.6 X10(3)/MCL (ref 0.6–4.6)
LYMPHOCYTES NFR BLD AUTO: 28.8 %
MCH RBC QN AUTO: 30.5 PG (ref 27–31)
MCHC RBC AUTO-ENTMCNC: 32.2 GM/DL (ref 33–36)
MCV RBC AUTO: 95 FL (ref 80–94)
MONOCYTES # BLD AUTO: 0.6 X10(3)/MCL (ref 0.1–1.3)
MONOCYTES NFR BLD AUTO: 6.9 %
NEUTROPHILS # BLD AUTO: 5.4 X10(3)/MCL (ref 2.1–9.2)
NEUTROPHILS NFR BLD AUTO: 61.1 %
PLATELET # BLD AUTO: 177 X10(3)/MCL (ref 130–400)
PMV BLD AUTO: 8.7 FL (ref 9.4–12.4)
POC CREATININE: 1 MG/DL (ref 0.6–1.3)
POTASSIUM SERPL-SCNC: 4.6 MMOL/L (ref 3.5–5.1)
PROT SERPL-MCNC: 6.8 GM/DL (ref 5.8–7.6)
RBC # BLD AUTO: 4.16 X10(6)/MCL (ref 4.2–5.4)
SODIUM SERPL-SCNC: 137 MMOL/L (ref 136–145)
WBC # SPEC AUTO: 8.8 X10(3)/MCL (ref 4.5–11.5)

## 2021-08-20 ENCOUNTER — HISTORICAL (OUTPATIENT)
Dept: INFUSION THERAPY | Facility: HOSPITAL | Age: 81
End: 2021-08-20

## 2021-09-10 ENCOUNTER — HISTORICAL (OUTPATIENT)
Dept: ADMINISTRATIVE | Facility: HOSPITAL | Age: 81
End: 2021-09-10

## 2021-09-10 LAB
ABS NEUT (OLG): 10.02 X10(3)/MCL (ref 2.1–9.2)
ALBUMIN SERPL-MCNC: 4.1 GM/DL (ref 3.4–4.8)
ALBUMIN/GLOB SERPL: 1.3 RATIO (ref 1.1–2)
ALP SERPL-CCNC: 75 UNIT/L (ref 40–150)
ALT SERPL-CCNC: 12 UNIT/L (ref 0–55)
APPEARANCE, UA: ABNORMAL
APTT PPP: 29.6 SECOND(S) (ref 23.2–33.7)
AST SERPL-CCNC: 14 UNIT/L (ref 5–34)
BACTERIA SPEC CULT: ABNORMAL /HPF
BASOPHILS # BLD AUTO: 0.1 X10(3)/MCL (ref 0–0.2)
BASOPHILS NFR BLD AUTO: 0 %
BILIRUB SERPL-MCNC: 0.3 MG/DL
BILIRUB UR QL STRIP: NEGATIVE
BILIRUBIN DIRECT+TOT PNL SERPL-MCNC: 0.1 MG/DL (ref 0–0.8)
BILIRUBIN DIRECT+TOT PNL SERPL-MCNC: 0.2 MG/DL (ref 0–0.5)
BUN SERPL-MCNC: 19.4 MG/DL (ref 9.8–20.1)
CALCIUM SERPL-MCNC: 9.9 MG/DL (ref 8.4–10.2)
CHLORIDE SERPL-SCNC: 102 MMOL/L (ref 98–107)
CO2 SERPL-SCNC: 30 MMOL/L (ref 23–31)
COLOR UR: YELLOW
CREAT SERPL-MCNC: 0.83 MG/DL (ref 0.55–1.02)
EOSINOPHIL # BLD AUTO: 0 X10(3)/MCL (ref 0–0.9)
EOSINOPHIL NFR BLD AUTO: 0 %
ERYTHROCYTE [DISTWIDTH] IN BLOOD BY AUTOMATED COUNT: 12.8 % (ref 11.5–17)
GLOBULIN SER-MCNC: 3.2 GM/DL (ref 2.4–3.5)
GLUCOSE (UA): NEGATIVE
GLUCOSE SERPL-MCNC: 109 MG/DL (ref 82–115)
HCT VFR BLD AUTO: 42.1 % (ref 37–47)
HGB BLD-MCNC: 13.6 GM/DL (ref 12–16)
HGB UR QL STRIP: NEGATIVE
INR PPP: 1 (ref 0–1.3)
KETONES UR QL STRIP: NEGATIVE
LEUKOCYTE ESTERASE UR QL STRIP: ABNORMAL
LYMPHOCYTES # BLD AUTO: 1.6 X10(3)/MCL (ref 0.6–4.6)
LYMPHOCYTES NFR BLD AUTO: 13 %
MCH RBC QN AUTO: 30.4 PG (ref 27–31)
MCHC RBC AUTO-ENTMCNC: 32.3 GM/DL (ref 33–36)
MCV RBC AUTO: 94.2 FL (ref 80–94)
MONOCYTES # BLD AUTO: 0.8 X10(3)/MCL (ref 0.1–1.3)
MONOCYTES NFR BLD AUTO: 7 %
NEUTROPHILS # BLD AUTO: 10.02 X10(3)/MCL (ref 2.1–9.2)
NEUTROPHILS NFR BLD AUTO: 79 %
NITRITE UR QL STRIP: POSITIVE
PH UR STRIP: 5.5 [PH] (ref 5–9)
PLATELET # BLD AUTO: 231 X10(3)/MCL (ref 130–400)
PMV BLD AUTO: 9.9 FL (ref 9.4–12.4)
POTASSIUM SERPL-SCNC: 4.8 MMOL/L (ref 3.5–5.1)
PROT SERPL-MCNC: 7.3 GM/DL (ref 5.8–7.6)
PROT UR QL STRIP: NEGATIVE
PROTHROMBIN TIME: 12.8 SECOND(S) (ref 12.5–14.5)
RBC # BLD AUTO: 4.47 X10(6)/MCL (ref 4.2–5.4)
RBC #/AREA URNS HPF: ABNORMAL /[HPF]
SARS-COV-2 RNA RESP QL NAA+PROBE: NOT DETECTED
SODIUM SERPL-SCNC: 140 MMOL/L (ref 136–145)
SP GR UR STRIP: 1.02 (ref 1–1.03)
SQUAMOUS EPITHELIAL, UA: ABNORMAL /HPF (ref 0–4)
UROBILINOGEN UR STRIP-ACNC: 0.2
WBC # SPEC AUTO: 12.7 X10(3)/MCL (ref 4.5–11.5)
WBC #/AREA URNS HPF: 10 /HPF (ref 0–3)

## 2021-09-14 ENCOUNTER — HISTORICAL (OUTPATIENT)
Dept: ADMINISTRATIVE | Facility: HOSPITAL | Age: 81
End: 2021-09-14

## 2021-11-18 ENCOUNTER — HISTORICAL (OUTPATIENT)
Dept: INFUSION THERAPY | Facility: HOSPITAL | Age: 81
End: 2021-11-18

## 2022-02-15 ENCOUNTER — HISTORICAL (OUTPATIENT)
Dept: ADMINISTRATIVE | Facility: HOSPITAL | Age: 82
End: 2022-02-15

## 2022-02-15 LAB
ABS NEUT (OLG): 7.4 (ref 2.1–9.2)
ALBUMIN SERPL-MCNC: 3.5 G/DL (ref 3.4–4.8)
ALBUMIN/GLOB SERPL: 1 {RATIO} (ref 1.1–2)
ALP SERPL-CCNC: 76 U/L (ref 40–150)
ALT SERPL-CCNC: 17 U/L (ref 0–55)
AST SERPL-CCNC: 18 U/L (ref 5–34)
BASOPHILS # BLD AUTO: 0 10*3/UL (ref 0–0.2)
BASOPHILS NFR BLD AUTO: 0.5 %
BILIRUB SERPL-MCNC: 0.4 MG/DL
BILIRUBIN DIRECT+TOT PNL SERPL-MCNC: 0.2 (ref 0–0.5)
BILIRUBIN DIRECT+TOT PNL SERPL-MCNC: 0.2 (ref 0–0.8)
BUN SERPL-MCNC: 18.6 MG/DL (ref 9.8–20.1)
CALCIUM SERPL-MCNC: 9.8 MG/DL (ref 8.7–10.5)
CANCER AG125 SERPL-ACNC: 47.4 (ref 0–35)
CHLORIDE SERPL-SCNC: 103 MMOL/L (ref 98–107)
CO2 SERPL-SCNC: 27 MMOL/L (ref 23–31)
CREAT SERPL-MCNC: 0.96 MG/DL (ref 0.55–1.02)
EOSINOPHIL # BLD AUTO: 0.3 10*3/UL (ref 0–0.9)
EOSINOPHIL NFR BLD AUTO: 2.6 %
ERYTHROCYTE [DISTWIDTH] IN BLOOD BY AUTOMATED COUNT: 12.8 % (ref 11.5–17)
GLOBULIN SER-MCNC: 3.6 G/DL (ref 2.4–3.5)
GLUCOSE SERPL-MCNC: 130 MG/DL (ref 82–115)
HCT VFR BLD AUTO: 41.4 % (ref 37–47)
HEMOLYSIS INTERF INDEX SERPL-ACNC: 4
HGB BLD-MCNC: 13.2 G/DL (ref 12–16)
ICTERIC INTERF INDEX SERPL-ACNC: 0
LIPEMIC INTERF INDEX SERPL-ACNC: 2
LYMPHOCYTES # BLD AUTO: 1.7 10*3/UL (ref 0.6–4.6)
LYMPHOCYTES NFR BLD AUTO: 16.5 %
MANUAL DIFF? (OHS): NO
MCH RBC QN AUTO: 30.6 PG (ref 27–31)
MCHC RBC AUTO-ENTMCNC: 31.9 G/DL (ref 33–36)
MCV RBC AUTO: 96.1 FL (ref 80–94)
MONOCYTES # BLD AUTO: 0.8 10*3/UL (ref 0.1–1.3)
MONOCYTES NFR BLD AUTO: 7.3 %
NEUTROPHILS # BLD AUTO: 7.4 10*3/UL (ref 2.1–9.2)
NEUTROPHILS NFR BLD AUTO: 72.5 %
PLATELET # BLD AUTO: 224 10*3/UL (ref 130–400)
PMV BLD AUTO: 9.1 FL (ref 9.4–12.4)
POTASSIUM SERPL-SCNC: 4.3 MMOL/L (ref 3.5–5.1)
PROT SERPL-MCNC: 7.1 G/DL (ref 5.8–7.6)
RBC # BLD AUTO: 4.31 10*6/UL (ref 4.2–5.4)
SODIUM SERPL-SCNC: 142 MMOL/L (ref 136–145)
WBC # SPEC AUTO: 10.2 10*3/UL (ref 4.5–11.5)

## 2022-02-23 ENCOUNTER — HISTORICAL (OUTPATIENT)
Dept: ADMINISTRATIVE | Facility: HOSPITAL | Age: 82
End: 2022-02-23

## 2022-04-11 ENCOUNTER — HISTORICAL (OUTPATIENT)
Dept: ADMINISTRATIVE | Facility: HOSPITAL | Age: 82
End: 2022-04-11
Payer: MEDICARE

## 2022-04-13 ENCOUNTER — HISTORICAL (OUTPATIENT)
Dept: HEMATOLOGY/ONCOLOGY | Facility: CLINIC | Age: 82
End: 2022-04-13
Payer: MEDICARE

## 2022-04-13 LAB
ABS NEUT (OLG): 16.26 (ref 2.1–9.2)
ALBUMIN SERPL-MCNC: 4.1 G/DL (ref 3.4–4.8)
ALBUMIN/GLOB SERPL: 1.2 {RATIO} (ref 1.1–2)
ALP SERPL-CCNC: 70 U/L (ref 40–150)
ALT SERPL-CCNC: 18 U/L (ref 0–55)
AST SERPL-CCNC: 16 U/L (ref 5–34)
BASOPHILS # BLD AUTO: 0 10*3/UL (ref 0–0.2)
BASOPHILS NFR BLD AUTO: 0.1 %
BILIRUB SERPL-MCNC: 0.3 MG/DL
BILIRUBIN DIRECT+TOT PNL SERPL-MCNC: 0.1 (ref 0–0.8)
BILIRUBIN DIRECT+TOT PNL SERPL-MCNC: 0.2 (ref 0–0.5)
BUN SERPL-MCNC: 23.7 MG/DL (ref 9.8–20.1)
CALCIUM SERPL-MCNC: 9.9 MG/DL (ref 8.7–10.5)
CANCER AG125 SERPL-ACNC: 33.3 (ref 0–35)
CHLORIDE SERPL-SCNC: 103 MMOL/L (ref 98–107)
CO2 SERPL-SCNC: 25 MMOL/L (ref 23–31)
CREAT SERPL-MCNC: 0.97 MG/DL (ref 0.55–1.02)
EOSINOPHIL # BLD AUTO: 0 10*3/UL (ref 0–0.9)
EOSINOPHIL NFR BLD AUTO: 0 %
ERYTHROCYTE [DISTWIDTH] IN BLOOD BY AUTOMATED COUNT: 12.9 % (ref 11.5–17)
GLOBULIN SER-MCNC: 3.3 G/DL (ref 2.4–3.5)
GLUCOSE SERPL-MCNC: 91 MG/DL (ref 82–115)
HCT VFR BLD AUTO: 43.2 % (ref 37–47)
HEMOLYSIS INTERF INDEX SERPL-ACNC: 15
HGB BLD-MCNC: 13.8 G/DL (ref 12–16)
ICTERIC INTERF INDEX SERPL-ACNC: 0
LIPEMIC INTERF INDEX SERPL-ACNC: 1
LYMPHOCYTES # BLD AUTO: 1.7 10*3/UL (ref 0.6–4.6)
LYMPHOCYTES NFR BLD AUTO: 9.1 %
MANUAL DIFF? (OHS): NO
MCH RBC QN AUTO: 30.7 PG (ref 27–31)
MCHC RBC AUTO-ENTMCNC: 31.9 G/DL (ref 33–36)
MCV RBC AUTO: 96.2 FL (ref 80–94)
MONOCYTES # BLD AUTO: 1 10*3/UL (ref 0.1–1.3)
MONOCYTES NFR BLD AUTO: 5.3 %
NEUTROPHILS # BLD AUTO: 16.3 10*3/UL (ref 2.1–9.2)
NEUTROPHILS NFR BLD AUTO: 85.1 %
PLATELET # BLD AUTO: 248 10*3/UL (ref 130–400)
PMV BLD AUTO: 9 FL (ref 9.4–12.4)
POTASSIUM SERPL-SCNC: 4.7 MMOL/L (ref 3.5–5.1)
PROT SERPL-MCNC: 7.4 G/DL (ref 5.8–7.6)
RBC # BLD AUTO: 4.49 10*6/UL (ref 4.2–5.4)
SODIUM SERPL-SCNC: 140 MMOL/L (ref 136–145)
WBC # SPEC AUTO: 19.1 10*3/UL (ref 4.5–11.5)

## 2022-04-27 VITALS
WEIGHT: 199.75 LBS | DIASTOLIC BLOOD PRESSURE: 89 MMHG | HEIGHT: 70 IN | BODY MASS INDEX: 28.6 KG/M2 | SYSTOLIC BLOOD PRESSURE: 180 MMHG

## 2022-04-30 NOTE — OP NOTE
DATE OF SURGERY:    09/20/2019    SURGEON:  Darrian Brown IV, MD    PREOPERATIVE DIAGNOSIS:  Uterine cancer.    POSTOPERATIVE DIAGNOSIS:  Uterine cancer.    PROCEDURE:  Implantation of 8-Italian PowerPort.    ANESTHESIA:  Local with IV sedation.    PROCEDURE IN DETAIL:  The patient was taken to the operating room under informed consent and placed on the table in the supine position, where under IV sedation, the anterior chest was prepped and draped in the usual sterile fashion.  1% lidocaine was infiltrated over the left clavicular notch, and the subclavian vein was cannulated easily percutaneously, and the guidewire was advanced, and the position was confirmed under fluoroscopy.  1% lidocaine was infiltrated over the anterior chest wall, and a pocket was created for the PowerPort.  The PowerPort attached to the catheter, and the catheter tunneled from the chest wall incision to the clavicular incision, and the device was implanted in its pocket and flushed.  Next, the peel-away introducer was advanced over the guidewire, and the catheter was cut to the appropriate length, and then, the wire was removed from the peel-away introducer, and the catheter was advanced through the peel-away introducer, and secured into the superior vena cava.  Positioning was again confirmed under fluoroscopy, and it was easily aspirated and flushed with heparin, and the wound was irrigated, and the device was secured to the fascia with interrupted #2 Ethibond sutures.  The wound was closed with a 2-0 Vicryl in multiple layers, and the skin with a 3-0 subcuticular stitch subcu, and the 4-0 subcuticular was used on the clavicular incision.  The patient tolerated this procedure well and left the operating room in a stable condition.        ______________________________  Darrian Brown IV, MD    VET/UF  DD:  09/20/2019  Time:  11:35AM  DT:  09/20/2019  Time:  11:46AM  Job #:  [id number]

## 2022-04-30 NOTE — OP NOTE
Patient:   Shalini Alcaraz            MRN: [id number]            FIN: [id number]               Age:   80 years     Sex:  Female     :  1940   Associated Diagnoses:   None   Author:   Kevin MILES MD, Victor      Brief Operative Note   Operative Information   Date/ Time:  2021 08:07:00.     Preoperative Diagnosis (end of life of mediport).     Postoperative Diagnosis (same).     Procedures Performed (Removal of mediport).     Surgeon: Kevin MILES MD, Victor.     Assistant: Johnson Colindres MD.

## 2022-04-30 NOTE — OP NOTE
DATE OF SURGERY:    09/14/2021    SURGEON:  Darrian Brown IV, MD  ASSISTANT:  Jens    PREOPERATIVE DIAGNOSIS:  End of life of MediPort.    POSTOPERATIVE DIAGNOSIS:  End of life of MediPort.    PROCEDURE:  Removal of MediPort.    ANESTHESIA:  Local with IV sedation.    PROCEDURE IN DETAIL:  Patient was taken to the operating room under informed consent, placed on the table in supine position, where, under IV sedation, the left anterior chest was prepped and draped in the usual sterile fashion.  1% lidocaine infiltrated over the MediPort.  Incision made, carried down to the pocket.  The pocket entered, the device explanted, the sutures cut and removed, and then the catheter pulled out in total.  The pocket itself was also excised to get down to some raw tissue.  Wound was irrigated and closed with a running 2-0 Vicryl and the skin with a 3-0 subcuticular stitch.  The patient tolerated this procedure well, left the operating room in stable condition.        ______________________________  Darrian Brown IV, MD    VET/UG  DD:  09/14/2021  Time:  08:10AM  DT:  09/14/2021  Time:  08:24AM  Job #:  [id number]

## 2022-04-30 NOTE — PROGRESS NOTES
Patient:   Shalini Alcaraz            MRN: [id number]            FIN: [id number]               Age:   79 years     Sex:  Female     :  1940   Associated Diagnoses:   Endometrial cancer; Anemia   Author:   Annie Leiva MD      GYN/Oncology: Dr. Tamika Quesada    Endometrial Cancer Stage IA--Diagnosed 19  Biopsy/pathology:  EMB done 19--high grade serous carcinoma.  Surgery/pathology:  1. 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 (vast majority of tumor involves the exophytic portion of the polyp, but there is a small area of myometrial invasion at the base, tumor cells are positive for P16 and negative for P53).  Imagin. Pelvic US 19--uterus measures 4.8X3.8X8cm, thickened endometrium 2cm, right ovary 1.1cm, left ovary 2.4cm, left pelvic kidney and parapelvic cyst 4.1cm.  2. CT C/A/P 7/3/19 done at [facility] showed no evidence of metastatic disease, exophytic uterine lesion, likely pedunculated fibroid 2.9cm, few subcentimeter pulmonary nodules, etiology uncertain, follow-up needed, s/p cardiac surgery including MVR and ligation left atrial appendage, atherosclerosis, s/p cholecystectomy, ectopic left kidney in pelvis.    S/p Left CW mediport insertion by Dr. Brown on 19.     Treatment history:  Vaginal cuff brachytherapy X 3 treatments completed 19--19.    Treatment plan:  Carboplatin/Paclitaxel x 6 cycles. Started on 19.  Cycle # 5 due on 19. Neulasta on hold.       Visit Information   Visit type:  Scheduled  follow-up.    Accompanied by:  Spouse.       Chief Complaint   12/5/2019 10:25 CST      SOB        Interval History   Current complaint: The patient presents today scheduled treatment visit for her endometrial cancer. She is s/p cycle 4 given last week. She c/o extreme fatigue and SOB when she walks 20-30 feet. She reports that this occurred after cycle 3 as well. She was checked out by her cardiologist Dr. Newell and her heart checked out good. She denies any cough or chest pain. She does report on standing she has some lightheadedness at times. She does have some numbness/tingling in hands and feet but she did have this before in her feet. It comes and goes and she does not feel like it has gotten any worse. Patient is on Eliquis.      Review of Systems   Constitutional:  Fatigue, No fever, No chills, No weakness.    Eye:  No recent visual problem.    Ear/Nose/Mouth/Throat:  No decreased hearing, No nasal congestion, No sore throat.    Respiratory:  Shortness of breath, Exertional dyspnea, No cough, No wheezing.    Cardiovascular:  No chest pain, No palpitations, No peripheral edema.    Gastrointestinal:  No nausea, No vomiting, No diarrhea, No constipation, No abdominal pain.    Hematology/Lymphatics:  No bruising tendency, No bleeding tendency.    Musculoskeletal:  Joint pain, Muscle pain.         Back pain: In the lower region, chronic.    Integumentary:  Left CW mediport, No rash, No skin lesion.    Neurologic:  Numbness, Tingling, to lower extremities - chronic from back problems. No change, No confusion, No headache.    Psychiatric:  No anxiety, No depression.    All other systems are negative      Health Status   Allergies:    Allergies (4) Active Reaction  Anti-inflammatory agent nausea and vomiting  Benadryl nervousness anxious  codeine hallucination  NSAIDs Diarrhea     Current medications:  (Selected)   Documented Medications  Documented  Eliquis 5 mg oral tablet: 5 mg = 1 tab(s), Oral, BID, # 60  tab(s), 0 Refill(s)  METOPROLOL TARTRATE 50 MG TAB: 50 mg = 1 tab(s), Oral, BID  Vitamin B Complex oral capsule: 1 tab(s), Oral, Daily, 0 Refill(s)  Vitamin B6 250 mg oral capsule: tab 1, Oral, Daily, 0 Refill(s)  Zofran 8 mg oral tablet: See Instructions, PRN PRN as needed for nausea/vomiting, 1 tab(s) Oral TID for 3 days after chemotherapy and q8hr, 0 Refill(s)  acetaminophen-hydrocodone 325 mg-10 mg oral tablet: 0.5 tab(s), Oral, q6hr, 0 Refill(s)  digoxin 125 mcg (0.125 mg) oral tablet: 0.125 mg = 1 tab(s), Oral, Daily, 0 Refill(s)  gabapentin 300 mg oral capsule: 300 mg = 1 cap(s), Oral, qPM  prochlorperazine 5 mg oral tablet: 5 mg = 1 tab(s), Oral, q6hr, PRN PRN as needed for nausea/vomiting, # 30 tab(s), 0 Refill(s)   Problem list:    Active Problems (6)  Back pain   Cancer   Cancer   Colitis, collagenous   Obesity   Review of care plan         Histories   Past Medical History:    Resolved  Glasses (6455309432):  Resolved.  Hypertension (OO02F6I1--H7U3-F2TN3PV28S84):  Resolved.  Mitral valve prolapse (6293981928):  Resolved.  Able to lie down (866196888):  Resolved.  Activity tolerance (0126716767):  Resolved.  Arthritis (1771825):  Resolved.  Chronic back pain (T7T70I0W-1V66-11B7-[id number]):  Resolved.  Numbness and tingling (Y07O7V23-F33X-8D20-GW40-802FV1A466F0):  Resolved.  Post menopausal syndrome ([id number]-J29M-73E9Z2G2T0DD):  Resolved.  Atrial fibrillation (26315391):  Resolved.  Comments:  9/1/2016 CDT 6:58 [place] - Bk ZABALA, Soumya HESS  maintains NSR with medication; Dr. Newell; on eliquis  CHF (congestive heart failure) ([id number]):  Resolved.  CHF (congestive heart failure) ([id number]-2R9A-[id number]):  Resolved.  Spinal stenosis (831473294):  Resolved.  A-fib (5T9I0U8W-7M2J-090X-ZF2R-8008U44057Y3):  Resolved.  Colitis, collagenous (457950SH-756E-[id number]):  Resolved.  Mitral regurgitation  ([id number]):  Resolved.   Family History:    Mitral valve disorder  Mother     Procedure history:    Catheter Insertion Mediport (.) on 9/20/2019 at 78 Years.  Comments:  9/20/2019 11:47 Parul Burgos  auto-populated from documented surgical case  Minimally Invasive Maze Mitral Valve Replace (.) on 5/19/2017 at 76 Years.  Comments:  5/19/2017 10:45 Liss Humphrey RN  auto-populated from documented surgical case  03670 - LT DEST PVFJNRV LUM / SAC SING INJ on 9/1/2016 at 75 Years.  Comments:  9/8/2016 14:38 Yesy Robertson RN  auto-populated from documented surgical case  60120 - LT DEST PVFJNRV LUM / SAC EA ADD'L  INJ on 9/1/2016 at 75 Years.  Comments:  9/8/2016 14:38 Yesy Robertson RN  auto-populated from documented surgical case  55642 - RT DEST PVFJNRV LUM / SAC SING INJ on 9/1/2016 at 75 Years.  Comments:  9/8/2016 14:38 Yesy Robertson RN  auto-populated from documented surgical case  47804 - RT DEST PVFJNRV LUM / SAC EA ADD'L  INJ on 9/1/2016 at 75 Years.  Comments:  9/8/2016 14:38 Yesy Robertson RN  auto-populated from documented surgical case  56366 - INJ PARAVERT F JNT LUM / SAC 1 LEVEL (Left) on 10/6/2015 at 74 Years.  Comments:  10/6/2015 10:04 Ana Luisa Marquez RN  auto-populated from documented surgical case  64714 - INJ PARAVERT F JNT LUM / SAC 2 LEVEL (Left) on 10/6/2015 at 74 Years.  Comments:  10/6/2015 10:04 Ana Luisa Marquez RN  auto-populated from documented surgical case  89064 - INJ PARAVERT F JNT LUM / SAC 2 LEVEL (Right) on 10/6/2015 at 74 Years.  Comments:  10/15/2015 8:49 Yesy Robertson RN  auto-populated from documented surgical case  34534 - INJ PARAVERT F JNT LUM / SAC 1 LEVEL (Right) on 10/6/2015 at 74 Years.  Comments:  10/15/2015 8:49 Yesy Robertson RN  auto-populated from documented surgical case  37752 - INJ SPINAL EPIDURAL LUM / SAC (None) on 8/4/2015 at 74  Years.  Comments:  8/4/2015 10:08 [place] Gardner RN, Sadie SHARIF  auto-populated from documented surgical case  Appendectomy (47.0).  Cholecystectomy (51.22).  Knee (376766424).  Comments:  8/4/2015 9:31 Amna Strong RN  left-arthroscopy  Hysterectomy (526135263).  Colonoscopy (027838942).  Esophagogastroduodenoscopy (160392626).  Extraction of wisdom tooth (286775379).   Social History        Social & Psychosocial Habits    Alcohol  08/03/2015 Risk Assessment: Low Risk    08/03/2015  Use: Current    Type: Wine    Frequency: 1-2 times per month    Employment/School  08/03/2015 Risk Assessment: Not employed or in school    08/03/2015  Status: Retired    Exercise    Comment: None - 08/23/2016 11:30 - Praveen Maldonado    Home/Environment  08/03/2015 Risk Assessment: Low Risk    08/03/2015  Lives with: Spouse    Nutrition/Health  08/23/2016  Type of diet: Regular    Substance Use  08/03/2015 Risk Assessment: Denies Substance Abuse    08/23/2016  Use: Never    Tobacco  08/04/2015 Risk Assessment: Denies Tobacco Use    08/04/2015  Use: Former smoker    Type: Cigarettes    Comment: quit 1985 - 08/04/2015 09:31 - Amna Gomez RN    09/09/2019  Use: Former smoker, quit more    Patient Wants Consult For Cessation Counseling N/A    09/19/2019  Use: Former smoker, quit more    Patient Wants Consult For Cessation Counseling No    09/20/2019  Use: Former smoker, quit more    Patient Wants Consult For Cessation Counseling N/A    09/24/2019  Use: Former smoker, quit more    Patient Wants Consult For Cessation Counseling No    10/10/2019  Use: Former smoker, quit more    Patient Wants Consult For Cessation Counseling No    10/24/2019  Use: Former smoker, quit more    Patient Wants Consult For Cessation Counseling N/A    12/05/2019  Use: Former smoker, quit more    Patient Wants Consult For Cessation Counseling No    Abuse/Neglect  09/09/2019  SHX Any signs of abuse or neglect No    Feels unsafe at home: No    Safe place  to go: Yes    09/17/2019  SHX Any signs of abuse or neglect No    09/20/2019  SHX Any signs of abuse or neglect No    09/24/2019  SHX Any signs of abuse or neglect No    10/08/2019  SHX Any signs of abuse or neglect No    10/15/2019  SHX Any signs of abuse or neglect No    10/24/2019  SHX Any signs of abuse or neglect No    12/05/2019  SHX Any signs of abuse or neglect No  .     Alcohol  Use: Current  Type: Wine  Frequency: 1-2 times per month    Employment/School  Status: Retired    Exercise  Comment: None     Home/Environment  Lives with: Spouse    Nutrition/Health  Type of diet: Regular    Substance Use  Use: Never    Tobacco  Use: Former smoker  Type: Cigarettes  Comment: quit 1985.        Physical Examination      Vital Signs (last 24 hrs)_____  Last Charted___________  Heart Rate Peripheral   72 bpm  (DEC 05 10:25)  SBP      H 143mmHg  (DEC 05 10:25)  DBP      88 mmHg  (DEC 05 10:25)  SpO2      100 %  (DEC 05 10:25)  Weight      91.0 kg  (DEC 05 10:25)  Height      177 cm  (DEC 05 10:25)  BMI      29.05  (DEC 05 10:25)     General:  Alert and oriented, No acute distress, pleasant white female.    Eye:  Vision unchanged.    HENT:  Normocephalic, Normal hearing, Oral mucosa is moist.    Neck:  Supple, Non-tender, No jugular venous distention, No lymphadenopathy.    Respiratory:  Lungs are clear to auscultation, Respirations are non-labored, Breath sounds are equal, Symmetrical chest wall expansion.    Cardiovascular:  Normal rate, Regular rhythm, No gallop, Systolic murmur, Normal peripheral perfusion, No edema, Trace edema to left ankle - unchanged.    Gastrointestinal:  Soft, Non-tender, Non-distended, Normal bowel sounds, No organomegaly, scattered laparoscopic incisions healed well.    Lymphatics:  No lymphadenopathy neck, axilla, groin.    Musculoskeletal:  Normal range of motion, Normal strength, No deformity,      Mobility/ gait: Able to walk with minimal assistance, Able to walk with a cane.     Integumentary:  Warm, Dry, Intact, Alopecia, Left chest wall mediport, intact.    Neurologic:  Alert, Oriented, Normal sensory, Normal motor function.    Psychiatric:  Cooperative, Appropriate mood & affect.    Cognition and Speech:  Oriented, Speech clear and coherent.    ECOG Performance Scale: 1- Strenuous physical activity restricted; fully ambulatory and able to carry out light work.      Review / Management   Results review:  Lab results   12/5/2019 10:13 CST      WBC                       4.6 x10(3)/mcL                             RBC                       3.82 x10(6)/mcL  LOW                             Hgb                       11.6 gm/dL  LOW                             Hct                       35.6 %  LOW                             Platelet                  124 x10(3)/mcL  LOW                             MCV                       93.2 fL                             MCH                       30.4 pg                             MCHC                      32.6 gm/dL  LOW                             RDW                       15.5 %                             MPV                       9.6 fL                             Abs Neut                  2.77 x10(3)/mcL                             NEUT%                     59.9 %  NA                             NEUT#                     2.8 x10(3)/mcL                             LYMPH%                    31.0 %  NA                             LYMPH#                    1.4 x10(3)/mcL                             MONO%                     7.6 %  NA                             MONO#                     0.4 x10(3)/mcL                             EOS%                      0.9 %  NA                             EOS#                      0.0 x10(3)/mcL                             BASO%                     0.4 %  NA                             BASO#                     0.0 x10(3)/mcL  .       Impression and Plan   Diagnosis     Endometrial cancer (EYS11-WW C54.1).     Anemia  (UUQ00-AH D64.9).     Plan   Patient with endometrial carcinoma, serous carcinoma, stage IA s/p surgery done 7/31/19.  Tumor was arising in a polyp with a small amount of myometrial invasion.  Per NCCN guidelines, treatment with systemic therapy plus vaginal brachytherapy is preferred.    Dr. Quesada has recommended 6 cycles of Carboplatin/Paclitaxel plus vaginal brachytherapy and I agree with treatment plan.    Started Carbo/Taxol on 9/24/19.   Completed her 4th cycle last week.   Now experiencing extreme fatigue and NAGEL. Negative work-up by her cardiologist.   CBC shows mild anemia and mild thrombocytopenia.  Labs otherwise good. Neulasta remains on hold.     Will get CTA of chest today to r/o any pulmonary cause for dyspnea.  Otherwise if CT okay, will plan for dose reduction cycle #5 by 15% and add IV fluids on day of treatment 500cc to see if this improves fatigue.  Labs only next week.    RTC for labs and treatment only on 12/17/19 for Cycle # 5.   Follow-up in 3 weeks with labs for toxicity check.   Recommended she hold off on steroid injections to her back while undergoing treatment.   All questions answered at this time.       Annie Leiva MD

## 2022-04-30 NOTE — CONSULTS
DATE OF CONSULTATION:  05/16/2017    ATTENDING PHYSICIAN:  Dr. Carlos Newell MD  CONSULTING PHYSICIAN:  Darrian Brown IV, MD    CHIEF COMPLAINT:  Shortness of breath.    HISTORY OF PRESENT ILLNESS:  Patient is a 76-year-old female who was recently admitted with new onset congestive heart failure and found to have severe mitral regurgitation and acute atrial fibrillation.  She is admitted today for elective left heart catheter which reveals only mild coronary artery disease.    PAST MEDICAL HISTORY:  Significant for the acute systolic congestive heart failure, atrial fibrillation, chronic back pain, colon issues with diarrhea related to NSAID ingestion.    PAST SURGICAL HISTORY:  Significant for an appendectomy, a left knee surgery and for multiple spinal injections, laparoscopic cholecystectomy.    ALLERGIES:  NSAIDS and codeine and Pentanol.    PAST SURGICAL HISTORY:  Negative for tobacco use recently, although she was smoking in 1985.    FAMILY HISTORY:  Positive for hypertension.    REVIEW OF SYSTEMS:  Positive for palpitations, shortness of breath, dyspnea on exertion.    PHYSICAL EXAMINATION:  GENERAL:  She is alert, oriented in no acute distress.       HEENT:  Pupils are equal, round and reactive to light and accommodation.  She is normocephalic and atraumatic.     NECK:  There is no jugular venous distension.     LUNGS:   Clear to auscultation bilaterally.     HEART:  Apparently has a regular rate and rhythm with a soft murmur of mitral regurgitation.     EXTREMITIES:  Have trace edema bilaterally.       ABDOMEN:  Soft and non-tender.    IMPRESSION:  Symptomatic mitral regurgitation with atrial fibrillation and congestive heart failure.  This patient will benefit from a minimally invasive approach to a mitral valve repair or replacement and a modified rather extensive MAZE ablation with ligation of her left atrial appendage.  I have discussed the risks, benefits, alternatives and valve selection  options and implications if needed in great detail with the patient and her  who is a retired physician.  They understand and would like to proceed.  We will plan for surgery this Friday.       Thank you for allowing me to participate in her care.        ______________________________  MD MARCOS Whitt IV/DALLIN  DD:  05/16/2017  Time:  01:07PM  DT:  05/16/2017  Time:  01:56PM  Job #:  [id number]

## 2022-04-30 NOTE — OP NOTE
Patient:   Shalini Alcaraz            MRN: [id number]            FIN: [id number]               Age:   78 years     Sex:  Female     :  1940   Associated Diagnoses:   None   Author:   Darrian Brown IV, MD      Brief Operative Note   Operative Information   Date/ Time:  2019 11:37:00.     Preoperative Diagnosis (Uterine cancer).     Postoperative Diagnosis (same).     Procedures Performed (Implantation of 8fr Powerport).     Surgeon: Darrian Brown IV, MD.

## 2022-07-13 DIAGNOSIS — R91.8 LUNG NODULE, MULTIPLE: ICD-10-CM

## 2022-07-13 DIAGNOSIS — C54.1 PAPILLARY SEROUS ENDOMETRIAL ADENOCARCINOMA: Primary | ICD-10-CM

## 2022-08-11 DIAGNOSIS — R91.8 LUNG NODULE, MULTIPLE: ICD-10-CM

## 2022-08-11 DIAGNOSIS — C54.1 PAPILLARY SEROUS ENDOMETRIAL ADENOCARCINOMA: Primary | ICD-10-CM

## 2022-08-12 ENCOUNTER — HOSPITAL ENCOUNTER (OUTPATIENT)
Dept: RADIOLOGY | Facility: HOSPITAL | Age: 82
Discharge: HOME | End: 2022-08-12
Attending: INTERNAL MEDICINE
Payer: MEDICARE

## 2022-08-12 DIAGNOSIS — C54.1 PAPILLARY SEROUS ENDOMETRIAL ADENOCARCINOMA: ICD-10-CM

## 2022-08-12 DIAGNOSIS — R91.8 LUNG NODULE, MULTIPLE: ICD-10-CM

## 2022-08-12 PROCEDURE — 71250 CT THORAX DX C-: CPT | Mod: TC

## 2022-08-16 NOTE — PROGRESS NOTES
Subjective:       Patient ID: Shalini Alcaraz is a 81 y.o. female.  GYN/Oncology: Dr. Tamika Quesada     Endometrial Cancer Stage IA--Diagnosed 19  Biopsy/pathology:  EMB done 19--high grade serous carcinoma.  Surgery/pathology:  1. 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 (vast majority of tumor involves the exophytic portion of the polyp, but there is a small area of myometrial invasion at the base, tumor cells are positive for P16 and negative for P53).  Imagin. Pelvic US 19--uterus measures 4.8X3.8X8cm, thickened endometrium 2cm, right ovary 1.1cm, left ovary 2.4cm, left pelvic kidney and parapelvic cyst 4.1cm.  2. CT C/A/P 7/3/19 done at [facility] showed no evidence of metastatic disease, exophytic uterine lesion, likely pedunculated fibroid 2.9cm, few subcentimeter pulmonary nodules, etiology uncertain, follow-up needed, s/p cardiac surgery including MVR and ligation left atrial appendage, atherosclerosis, s/p cholecystectomy, ectopic left kidney in pelvis.  3. CT of chest on 19--Negative for pulmonary thromboembolic disease.  Mosaic appearance of the lung parenchyma could be seen with edema or small vessel disease. Left lower lobe 6 mm nodule. Recommend attention on cancer surveillance scans.  4. CT chest w/ contrast 20--Stable left lower lobe nodule. No new suspicious findings.  5. CT chest w/ contrast 21--Stable 7 mm nodule in the lower lobe of the left lung, appears to be a mild increase in peripheral interstitial disease/fibrotic change in  the bilateral lungs. Atypical infection is  not excluded.  6. CT C/A/P done 2/23/22--Stable 8mm non-calcified left lower lobe pulmonary nodule and several small subcentimeter pleural nodules along the right upper lobe pleural surface, additional chronic incidental findings including emphysema and diffuse atherosclerotic disease, no acute pathology or metastatic disease identified at the abdomen or pelvis. Chronic findings as detailed above including left pelvic kidney. Post cholecystectomy, appendectomy, and hysterectomy.  7. CT chest w/o contrast 8/12/22--Newly visualized areas of irregular nodular consolidation and surrounding ground-glass attenuation scattered through both lungs are nonspecific, could represent sequela of acute infectious or inflammatory process.  Close attention on follow-up imaging is needed in order to ensure resolution, otherwise, scattered nodular foci through the lungs visualized on the prior study are without significant interval change and there are no new or worsening findings to suggest definite metastatic progression, chronic secondary details are without significant change from the 23 February 2022 appearance.     S/p Left CW mediport insertion by Dr. Brown on 9/20/19. Removed on 9/14/21.     CA-125:  2/15/22--47.4  4/13/22--33.3  8/12/22--42.6    Treatment history:  Vaginal cuff brachytherapy X 3 treatments completed 11/11/19--11/18/19.  Carboplatin/Paclitaxel x 4 cycles. 9/24/19 - 11/26/19. Stopped due to SOB and pneumonitis c/w Taxol induced lung injury, resolved with steroids.      Current Treatment: Observation     Chief Complaint: Other Misc (Pt reports knee pain. She is having a knee replacement in October.)    HPI   Patient presents for follow-up of h/o endometrial cancer. Recent CT shows some new nodular changes in lungs ?inflammatory. Patient has not had any recent infections, no cough. Discussed referral to pulmonology. CA-125 is slightly elevated but has been up and down  the last 6 months. Discussed follow-up CT in 3 months as well. She is scheduled to undergo knee replacement in 10/2022 with Dr. Negrete but I have told her to let him know about CT findings and she may need pulmonary clearance.     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  11    docusate sodium (COLACE) 100 MG capsule Take 100 mg by mouth daily as needed for Constipation.      ELIQUIS 5 mg Tab Take 5 mg by mouth 2 (two) times daily.  1    gabapentin (NEURONTIN) 300 MG capsule Take 300 mg by mouth every evening.   7    HYDROcodone-acetaminophen (NORCO)  mg per tablet TAKE 1 TABLET BY MOUTH EVERY 6 HOURS May cause drowsiness no alcohol  0    metoprolol tartrate (LOPRESSOR) 50 MG tablet Take 0.5 tablets (25 mg total) by mouth every 6 (six) hours. 60 tablet 0    glucosamine/chondr nickerson A sod (OSTEO BI-FLEX ORAL) Take by mouth once daily.      ondansetron (ZOFRAN) 8 MG tablet   See Instructions, PRN PRN as needed for nausea/vomiting, 1 tab(s) Oral TID for 3 days after  chemotherapy and q8hr, 0 Refill(s)      ondansetron (ZOFRAN) 8 MG tablet TAKE 1 TABLET BY MOUTH THREE TIMES DAILY FOR 3 DAYS AFTER CHEMOTHERAPY AND EVERY 8 HOURS AS NEEDED FOR NAUSEA  4    oxyCODONE-acetaminophen (PERCOCET) 5-325 mg per tablet Take 1 tablet by mouth every 4 (four) hours as needed. (Patient not taking: No sig reported) 20 tablet 0    prochlorperazine (COMPAZINE) 5 MG tablet Take 5 mg by mouth every 6 (six) hours as needed.  0     No current facility-administered medications on file prior to visit.      Review of Systems   Constitutional: Negative for appetite change, fatigue, fever and unexpected weight change.   HENT: Negative for mouth sores.    Eyes: Negative.    Respiratory: Negative for cough and shortness of breath.    Cardiovascular: Negative for chest pain and leg swelling.   Gastrointestinal: Negative for abdominal distention, abdominal pain, constipation, diarrhea, nausea, vomiting and reflux.   Genitourinary: Negative for difficulty urinating, dysuria and hematuria.   Musculoskeletal: Negative for arthralgias and back pain.   Integumentary:  Negative for rash.   Neurological: Negative for weakness and headaches.   Hematological: Negative for adenopathy.   Psychiatric/Behavioral: Negative for sleep disturbance. The patient is not nervous/anxious.               Physical Exam  Constitutional:       Appearance: Normal appearance.   HENT:      Head: Normocephalic.      Nose: Nose normal.      Mouth/Throat:      Mouth: Mucous membranes are moist.   Eyes:      Extraocular Movements: Extraocular movements intact.      Conjunctiva/sclera: Conjunctivae normal.   Cardiovascular:      Rate and Rhythm: Normal rate and regular rhythm.   Pulmonary:      Effort: Pulmonary effort is normal.      Breath sounds: Normal breath sounds.   Abdominal:      General: Bowel sounds are normal. There is no distension.      Palpations: Abdomen is soft.      Tenderness: There is no abdominal tenderness.    Musculoskeletal:         General: Normal range of motion.      Comments: Walks with a cane, bilateral knee swelling   Skin:     General: Skin is warm.   Neurological:      General: No focal deficit present.      Mental Status: She is alert and oriented to person, place, and time.   Psychiatric:         Mood and Affect: Mood normal.         Judgment: Judgment normal.         Lab Visit on 08/12/2022   Component Date Value    Cancer Antigen 125 08/12/2022 42.6 (A)    Sodium Level 08/12/2022 142     Potassium Level 08/12/2022 4.6     Chloride 08/12/2022 103     Carbon Dioxide 08/12/2022 28     Glucose Level 08/12/2022 89     Blood Urea Nitrogen 08/12/2022 13.1     Creatinine 08/12/2022 0.88     Calcium Level Total 08/12/2022 10.0     Protein Total 08/12/2022 6.9     Albumin Level 08/12/2022 3.9     Globulin 08/12/2022 3.0     Albumin/Globulin Ratio 08/12/2022 1.3     Bilirubin Total 08/12/2022 0.5     Alkaline Phosphatase 08/12/2022 73     Alanine Aminotransferase 08/12/2022 19     Aspartate Aminotransfera* 08/12/2022 20     eGFR 08/12/2022 >60     WBC 08/12/2022 10.4     RBC 08/12/2022 4.61     Hgb 08/12/2022 14.2     Hct 08/12/2022 43.2     MCV 08/12/2022 93.7     MCH 08/12/2022 30.8     MCHC 08/12/2022 32.9 (A)    RDW 08/12/2022 13.1     Platelet 08/12/2022 205     MPV 08/12/2022 9.2     Neut % 08/12/2022 66.6     Lymph % 08/12/2022 22.4     Mono % 08/12/2022 8.3     Eos % 08/12/2022 1.5     Basophil % 08/12/2022 0.6     Lymph # 08/12/2022 2.32     Neut # 08/12/2022 6.9     Mono # 08/12/2022 0.86     Eos # 08/12/2022 0.16     Baso # 08/12/2022 0.06     IG# 08/12/2022 0.06 (A)    IG% 08/12/2022 0.6             Assessment:       1. Papillary serous endometrial adenocarcinoma         Plan:       Patient with endometrial carcinoma, serous carcinoma, stage IA s/p surgery done 7/31/19.  Tumor was arising in a polyp with a small amount of myometrial invasion.  Per NCCN guidelines,  treatment with systemic therapy plus vaginal brachytherapy is preferred.  Dr. Quesada has recommended 6 cycles of Carboplatin/Paclitaxel plus vaginal brachytherapy and I agree with treatment plan.     Started Carbo/Taxol on 9/24/19. Completed her 4th cycle on 11/26/19.   Started experiencing extreme fatigue and NAGEL. Negative work-up by her cardiologist.   CT of chest on 12/5/19--Negative for pulmonary thromboembolic disease.  Mosaic appearance of the lung parenchyma could be seen with edema or small vessel disease. Left lower lobe 6 mm nodule. Recommend attention on cancer surveillance scans.  SOB continued despite Lasix for a few days. Patient treated with oral Prednisone taper and symptoms have now resolved. Suspect this was c/w Taxol pneumonitis.  Chemotherapy discontinued.     Currently patient is doing well without any signs or symptoms to suggest disease recurrence.  Her CA-125 was recently elevated at 47.4 in 2/2022, improved in 4/2022 now back slightly elevated again.   CT chest w/o contrast recent shows development of some new nodular areas, appear to be semi-solid and likely inflammatory. None look really amendable to biopsy, suspect would be low-yield but will refer to pulmonary for evaluation.    Plan to repeat CT chest w/o contrast in 3 months and will also repeat labs including CA-125. If CA-125 is more elevated, may consider also ordering a CT A/P at next visit.     She is scheduled for knee surgery in 10/2022. Will need to check with her surgeon to see if she will need pulmonary clearance.   Patient has not made it back to see Dr. Quesada due to COVID.  She has been followed by local GYN - Dr. Pires.      All questions answered at this time.     Annie Leiva MD

## 2022-08-18 ENCOUNTER — OFFICE VISIT (OUTPATIENT)
Dept: HEMATOLOGY/ONCOLOGY | Facility: CLINIC | Age: 82
End: 2022-08-18
Payer: MEDICARE

## 2022-08-18 VITALS
BODY MASS INDEX: 27.34 KG/M2 | OXYGEN SATURATION: 95 % | WEIGHT: 195.31 LBS | HEART RATE: 52 BPM | HEIGHT: 71 IN | SYSTOLIC BLOOD PRESSURE: 154 MMHG | RESPIRATION RATE: 14 BRPM | DIASTOLIC BLOOD PRESSURE: 84 MMHG | TEMPERATURE: 98 F

## 2022-08-18 DIAGNOSIS — C54.1 PAPILLARY SEROUS ENDOMETRIAL ADENOCARCINOMA: Primary | ICD-10-CM

## 2022-08-18 PROCEDURE — 99999 PR PBB SHADOW E&M-EST. PATIENT-LVL V: CPT | Mod: PBBFAC,,, | Performed by: INTERNAL MEDICINE

## 2022-08-18 PROCEDURE — 99215 OFFICE O/P EST HI 40 MIN: CPT | Mod: PBBFAC | Performed by: INTERNAL MEDICINE

## 2022-08-18 PROCEDURE — 99214 OFFICE O/P EST MOD 30 MIN: CPT | Mod: S$PBB,,, | Performed by: INTERNAL MEDICINE

## 2022-08-18 PROCEDURE — 99214 PR OFFICE/OUTPT VISIT, EST, LEVL IV, 30-39 MIN: ICD-10-PCS | Mod: S$PBB,,, | Performed by: INTERNAL MEDICINE

## 2022-08-18 PROCEDURE — 99999 PR PBB SHADOW E&M-EST. PATIENT-LVL V: ICD-10-PCS | Mod: PBBFAC,,, | Performed by: INTERNAL MEDICINE

## 2022-11-11 ENCOUNTER — HOSPITAL ENCOUNTER (OUTPATIENT)
Dept: RADIOLOGY | Facility: HOSPITAL | Age: 82
Discharge: HOME | End: 2022-11-11
Attending: INTERNAL MEDICINE
Payer: MEDICARE

## 2022-11-11 DIAGNOSIS — C54.1 PAPILLARY SEROUS ENDOMETRIAL ADENOCARCINOMA: ICD-10-CM

## 2022-11-11 PROCEDURE — 71250 CT THORAX DX C-: CPT | Mod: TC

## 2022-11-12 NOTE — PROGRESS NOTES
Subjective:       Patient ID: Shalini Alcaraz is a 82 y.o. female.  GYN/Oncology: Dr. 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  Surgery/pathology:  1. 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 (vast majority of tumor involves the exophytic portion of the polyp, but there is a small area of myometrial invasion at the base, tumor cells are positive for P16 and negative for P53).  Imagin. Pelvic US 19--uterus measures 4.8X3.8X8cm, thickened endometrium 2cm, right ovary 1.1cm, left ovary 2.4cm, left pelvic kidney and parapelvic cyst 4.1cm.  2. CT C/A/P 7/3/19 done at [facility] showed no evidence of metastatic disease, exophytic uterine lesion, likely pedunculated fibroid 2.9cm, few subcentimeter pulmonary nodules, etiology uncertain, follow-up needed, s/p cardiac surgery including MVR and ligation left atrial appendage, atherosclerosis, s/p cholecystectomy, ectopic left kidney in pelvis.  3. CT of chest on 19--Negative for pulmonary thromboembolic disease.  Mosaic appearance of the lung parenchyma could be seen with edema or small vessel disease. Left lower lobe 6 mm nodule. Recommend attention on cancer surveillance scans.  4. CT chest w/ contrast 20--Stable left lower lobe nodule. No new suspicious findings.  5. CT chest w/ contrast 21--Stable 7 mm nodule in the lower lobe of the left lung, appears to be a mild increase in peripheral interstitial disease/fibrotic change in  the bilateral lungs. Atypical infection is  not excluded.  6. CT C/A/P done 2/23/22--Stable 8mm non-calcified left lower lobe pulmonary nodule and several small subcentimeter pleural nodules along the right upper lobe pleural surface, additional chronic incidental findings including emphysema and diffuse atherosclerotic disease, no acute pathology or metastatic disease identified at the abdomen or pelvis. Chronic findings as detailed above including left pelvic kidney. Post cholecystectomy, appendectomy, and hysterectomy.  7. CT chest w/o contrast 8/12/22--Newly visualized areas of irregular nodular consolidation and surrounding ground-glass attenuation scattered through both lungs are nonspecific, could represent sequela of acute infectious or inflammatory process.  Close attention on follow-up imaging is needed in order to ensure resolution, otherwise, scattered nodular foci through the lungs visualized on the prior study are without significant interval change and there are no new or worsening findings to suggest definite metastatic progression, chronic secondary details are without significant change from the 23 February 2022 appearance.  8. CT chest w/o contrast done 11/11/22--Resolving patchy interstitial infiltrates in the lungs bilaterally suggesting resolving inflammatory process, chronic interstitial lung changes bilaterally and punctate areas of scattered nodularity bilaterally overall stable since prior examination.     S/p Left CW mediport insertion by Dr. Brown on 9/20/19. Removed on 9/14/21.     CA-125:  02/15/22--47.4  04/13/22--33.3  08/12/22--42.6  11/11/22--36.6    Treatment history:  Vaginal cuff brachytherapy X 3 treatments completed 11/11/19--11/18/19.  Carboplatin/Paclitaxel x 4 cycles. 9/24/19 - 11/26/19. Stopped due to SOB and pneumonitis c/w Taxol induced lung injury, resolved with steroids.      Current Treatment: Observation     Chief Complaint: Other Misc (Pt reports that she has  pressure on her chest mostly at night. Not really SOB. ) and Pain    HPI   Patient presents for follow-up of h/o endometrial cancer. She is doing okay. She had to postpone her knee surgery for her 's illness where he was hospitalized for 10 days for UTI/sepsis. He has now recovered. Patient reports some balance problems. She denies any SOB but has reported some occasional chest heaviness. She will see cardiology soon. Recent CT chest is improved.     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  11    docusate sodium (COLACE) 100 MG capsule Take 100 mg by mouth daily as needed for Constipation.      ELIQUIS 5 mg Tab Take 5 mg by mouth 2 (two) times daily.  1    gabapentin (NEURONTIN) 300 MG capsule Take 300 mg by mouth every evening.   7    HYDROcodone-acetaminophen (NORCO)  mg per tablet TAKE 1 TABLET BY MOUTH EVERY 6 HOURS May cause drowsiness no alcohol  0    metoprolol tartrate (LOPRESSOR) 50 MG tablet Take 0.5 tablets (25 mg total) by mouth every 6 (six) hours. 60 tablet 0     glucosamine/chondr nickerson A sod (OSTEO BI-FLEX ORAL) Take by mouth once daily.      ondansetron (ZOFRAN) 8 MG tablet   See Instructions, PRN PRN as needed for nausea/vomiting, 1 tab(s) Oral TID for 3 days after chemotherapy and q8hr, 0 Refill(s)      ondansetron (ZOFRAN) 8 MG tablet TAKE 1 TABLET BY MOUTH THREE TIMES DAILY FOR 3 DAYS AFTER CHEMOTHERAPY AND EVERY 8 HOURS AS NEEDED FOR NAUSEA  4    oxyCODONE-acetaminophen (PERCOCET) 5-325 mg per tablet Take 1 tablet by mouth every 4 (four) hours as needed. (Patient not taking: Reported on 9/30/2019) 20 tablet 0    prochlorperazine (COMPAZINE) 5 MG tablet Take 5 mg by mouth every 6 (six) hours as needed.  0     No current facility-administered medications on file prior to visit.      Review of Systems   Constitutional:  Negative for appetite change, fatigue, fever and unexpected weight change.   HENT:  Negative for mouth sores.    Eyes: Negative.    Respiratory:  Negative for cough and shortness of breath.    Cardiovascular:  Negative for chest pain and leg swelling.   Gastrointestinal:  Negative for abdominal distention, abdominal pain, constipation, diarrhea, nausea, vomiting and reflux.   Genitourinary:  Negative for difficulty urinating, dysuria and hematuria.   Musculoskeletal:  Negative for arthralgias and back pain.        Knee arthritis   Integumentary:  Negative for rash.   Neurological:  Negative for weakness and headaches.        Balance problems   Hematological:  Negative for adenopathy.   Psychiatric/Behavioral:  Negative for sleep disturbance. The patient is not nervous/anxious.        Vitals:    11/15/22 1136   BP: (!) 158/83   Pulse: (!) 57   Resp: 14   Temp: 98.8 °F (37.1 °C)          Physical Exam  Constitutional:       Appearance: Normal appearance.   HENT:      Head: Normocephalic.      Nose: Nose normal.      Mouth/Throat:      Mouth: Mucous membranes are moist.   Eyes:      Extraocular Movements: Extraocular movements intact.      Conjunctiva/sclera:  Conjunctivae normal.   Cardiovascular:      Rate and Rhythm: Normal rate and regular rhythm.   Pulmonary:      Effort: Pulmonary effort is normal.      Breath sounds: Normal breath sounds.   Abdominal:      General: Bowel sounds are normal. There is no distension.      Palpations: Abdomen is soft.      Tenderness: There is no abdominal tenderness.   Musculoskeletal:         General: Normal range of motion.      Comments: Walks with a cane, bilateral knee swelling   Skin:     General: Skin is warm.   Neurological:      General: No focal deficit present.      Mental Status: She is alert and oriented to person, place, and time.   Psychiatric:         Mood and Affect: Mood normal.         Judgment: Judgment normal.       Lab Visit on 11/11/2022   Component Date Value    Sodium Level 11/11/2022 140     Potassium Level 11/11/2022 4.4     Chloride 11/11/2022 105     Carbon Dioxide 11/11/2022 27     Glucose Level 11/11/2022 114     Blood Urea Nitrogen 11/11/2022 15.2     Creatinine 11/11/2022 0.99     Calcium Level Total 11/11/2022 9.9     Protein Total 11/11/2022 6.9     Albumin Level 11/11/2022 3.8     Globulin 11/11/2022 3.1     Albumin/Globulin Ratio 11/11/2022 1.2     Bilirubin Total 11/11/2022 0.5     Alkaline Phosphatase 11/11/2022 80     Alanine Aminotransferase 11/11/2022 17     Aspartate Aminotransfera* 11/11/2022 19     eGFR 11/11/2022 57     Cancer Antigen 125 11/11/2022 36.6 (H)     WBC 11/11/2022 10.5     RBC 11/11/2022 4.40     Hgb 11/11/2022 13.2     Hct 11/11/2022 41.8     MCV 11/11/2022 95.0 (H)     MCH 11/11/2022 30.0     MCHC 11/11/2022 31.6 (L)     RDW 11/11/2022 12.2     Platelet 11/11/2022 217     MPV 11/11/2022 9.1     Neut % 11/11/2022 69.3     Lymph % 11/11/2022 20.3     Mono % 11/11/2022 7.0     Eos % 11/11/2022 2.3     Basophil % 11/11/2022 0.6     Lymph # 11/11/2022 2.12     Neut # 11/11/2022 7.3     Mono # 11/11/2022 0.73     Eos # 11/11/2022 0.24     Baso # 11/11/2022 0.06     IG# 11/11/2022  0.05 (H)     IG% 11/11/2022 0.5             Assessment:       1. Papillary serous endometrial adenocarcinoma           Plan:       Patient with endometrial carcinoma, serous carcinoma, stage IA s/p surgery done 7/31/19.  Tumor was arising in a polyp with a small amount of myometrial invasion.  Per NCCN guidelines, treatment with systemic therapy plus vaginal brachytherapy is preferred.  Dr. Quesada has recommended 6 cycles of Carboplatin/Paclitaxel plus vaginal brachytherapy and I agree with treatment plan.     Started Carbo/Taxol on 9/24/19. Completed her 4th cycle on 11/26/19.   Started experiencing extreme fatigue and NAGEL. Negative work-up by her cardiologist.   CT of chest on 12/5/19--Negative for pulmonary thromboembolic disease.  Mosaic appearance of the lung parenchyma could be seen with edema or small vessel disease. Left lower lobe 6 mm nodule. Recommend attention on cancer surveillance scans.  SOB continued despite Lasix for a few days. Patient treated with oral Prednisone taper and symptoms have now resolved. Suspect this was c/w Taxol pneumonitis.  Chemotherapy discontinued.     Currently patient is doing well without any signs or symptoms to suggest disease recurrence.  Her CA-125 was recently elevated at 47.4 in 2/2022, improved in 4/2022, elevated again in 8/2022.  CT chest w/o contrast 8/12/22 showed development of some new nodular areas, appear to be semi-solid and likely inflammatory. None look really amendable to biopsy, suspect would be low-yield but will refer to pulmonary for evaluation.  CT chest w/o contrast done 11/11/22 improved.  Labs show CA-125 also improved but still slightly elevated.    Will have patient RTC in 3 months for follow-up with repeat labs and repeat CT C/A/P.  She is rescheduling her knee surgery soon and she plans to get pulmonary clearance from Dr. Lee.     Patient has not made it back to see Dr. Quesada due to COVID.  She has been followed by local GYN - Dr. Pires.       All questions answered at this time.     Annie Leiva MD

## 2022-11-15 ENCOUNTER — OFFICE VISIT (OUTPATIENT)
Dept: HEMATOLOGY/ONCOLOGY | Facility: CLINIC | Age: 82
End: 2022-11-15
Payer: MEDICARE

## 2022-11-15 ENCOUNTER — TELEPHONE (OUTPATIENT)
Dept: HEMATOLOGY/ONCOLOGY | Facility: CLINIC | Age: 82
End: 2022-11-15

## 2022-11-15 VITALS
BODY MASS INDEX: 27.28 KG/M2 | TEMPERATURE: 99 F | DIASTOLIC BLOOD PRESSURE: 83 MMHG | HEIGHT: 71 IN | OXYGEN SATURATION: 97 % | RESPIRATION RATE: 14 BRPM | WEIGHT: 194.88 LBS | SYSTOLIC BLOOD PRESSURE: 158 MMHG | HEART RATE: 57 BPM

## 2022-11-15 DIAGNOSIS — C54.1 PAPILLARY SEROUS ENDOMETRIAL ADENOCARCINOMA: Primary | ICD-10-CM

## 2022-11-15 PROCEDURE — 99214 PR OFFICE/OUTPT VISIT, EST, LEVL IV, 30-39 MIN: ICD-10-PCS | Mod: S$PBB,,, | Performed by: INTERNAL MEDICINE

## 2022-11-15 PROCEDURE — 99999 PR PBB SHADOW E&M-EST. PATIENT-LVL IV: CPT | Mod: PBBFAC,,, | Performed by: INTERNAL MEDICINE

## 2022-11-15 PROCEDURE — 99214 OFFICE O/P EST MOD 30 MIN: CPT | Mod: S$PBB,,, | Performed by: INTERNAL MEDICINE

## 2022-11-15 PROCEDURE — 99999 PR PBB SHADOW E&M-EST. PATIENT-LVL IV: ICD-10-PCS | Mod: PBBFAC,,, | Performed by: INTERNAL MEDICINE

## 2022-11-15 PROCEDURE — 99214 OFFICE O/P EST MOD 30 MIN: CPT | Mod: PBBFAC | Performed by: INTERNAL MEDICINE

## 2023-02-13 ENCOUNTER — LAB VISIT (OUTPATIENT)
Dept: LAB | Facility: HOSPITAL | Age: 83
End: 2023-02-13
Attending: INTERNAL MEDICINE
Payer: MEDICARE

## 2023-02-13 DIAGNOSIS — C54.1 PAPILLARY SEROUS ENDOMETRIAL ADENOCARCINOMA: ICD-10-CM

## 2023-02-13 LAB
ALBUMIN SERPL-MCNC: 3.6 G/DL (ref 3.4–4.8)
ALBUMIN/GLOB SERPL: 1.1 RATIO (ref 1.1–2)
ALP SERPL-CCNC: 72 UNIT/L (ref 40–150)
ALT SERPL-CCNC: 17 UNIT/L (ref 0–55)
AST SERPL-CCNC: 14 UNIT/L (ref 5–34)
BILIRUBIN DIRECT+TOT PNL SERPL-MCNC: 0.3 MG/DL
BUN SERPL-MCNC: 24.5 MG/DL (ref 9.8–20.1)
CALCIUM SERPL-MCNC: 9.7 MG/DL (ref 8.4–10.2)
CANCER AG125 SERPL-ACNC: 35.5 UNIT/ML (ref 0–35)
CHLORIDE SERPL-SCNC: 99 MMOL/L (ref 98–107)
CO2 SERPL-SCNC: 28 MMOL/L (ref 23–31)
CREAT SERPL-MCNC: 1.03 MG/DL (ref 0.55–1.02)
GFR SERPLBLD CREATININE-BSD FMLA CKD-EPI: 55 MLS/MIN/1.73/M2
GLOBULIN SER-MCNC: 3.3 GM/DL (ref 2.4–3.5)
GLUCOSE SERPL-MCNC: 113 MG/DL (ref 82–115)
POTASSIUM SERPL-SCNC: 4.7 MMOL/L (ref 3.5–5.1)
PROT SERPL-MCNC: 6.9 GM/DL (ref 5.8–7.6)
SODIUM SERPL-SCNC: 141 MMOL/L (ref 136–145)

## 2023-02-13 PROCEDURE — 36415 COLL VENOUS BLD VENIPUNCTURE: CPT

## 2023-02-13 PROCEDURE — 86304 IMMUNOASSAY TUMOR CA 125: CPT

## 2023-02-13 PROCEDURE — 85025 COMPLETE CBC W/AUTO DIFF WBC: CPT

## 2023-02-13 PROCEDURE — 80053 COMPREHEN METABOLIC PANEL: CPT

## 2023-02-14 ENCOUNTER — HOSPITAL ENCOUNTER (OUTPATIENT)
Dept: RADIOLOGY | Facility: HOSPITAL | Age: 83
Discharge: HOME | End: 2023-02-14
Attending: INTERNAL MEDICINE
Payer: MEDICARE

## 2023-02-14 DIAGNOSIS — C54.1 PAPILLARY SEROUS ENDOMETRIAL ADENOCARCINOMA: ICD-10-CM

## 2023-02-14 LAB
BASOPHILS # BLD AUTO: 0.04 X10(3)/MCL (ref 0–0.2)
BASOPHILS NFR BLD AUTO: 0.4 %
EOSINOPHIL # BLD AUTO: 0.19 X10(3)/MCL (ref 0–0.9)
EOSINOPHIL NFR BLD AUTO: 2 %
ERYTHROCYTE [DISTWIDTH] IN BLOOD BY AUTOMATED COUNT: 12.5 % (ref 11.5–17)
HCT VFR BLD AUTO: 43.4 % (ref 37–47)
HGB BLD-MCNC: 13.3 GM/DL (ref 12–16)
IMM GRANULOCYTES # BLD AUTO: 0.05 X10(3)/MCL (ref 0–0.04)
IMM GRANULOCYTES NFR BLD AUTO: 0.5 %
LYMPHOCYTES # BLD AUTO: 2.19 X10(3)/MCL (ref 0.6–4.6)
LYMPHOCYTES NFR BLD AUTO: 22.9 %
MCH RBC QN AUTO: 28.8 PG
MCHC RBC AUTO-ENTMCNC: 30.6 MG/DL (ref 33–36)
MCV RBC AUTO: 93.9 FL (ref 80–94)
MONOCYTES # BLD AUTO: 0.56 X10(3)/MCL (ref 0.1–1.3)
MONOCYTES NFR BLD AUTO: 5.9 %
NEUTROPHILS # BLD AUTO: 6.54 X10(3)/MCL (ref 2.1–9.2)
NEUTROPHILS NFR BLD AUTO: 68.3 %
PLATELET # BLD AUTO: 290 X10(3)/MCL (ref 130–400)
PMV BLD AUTO: 8.8 FL (ref 7.4–10.4)
RBC # BLD AUTO: 4.62 X10(6)/MCL (ref 4.2–5.4)
WBC # SPEC AUTO: 9.6 X10(3)/MCL (ref 4.5–11.5)

## 2023-02-14 PROCEDURE — 71260 CT THORAX DX C+: CPT | Mod: TC

## 2023-02-14 PROCEDURE — 74177 CT ABD & PELVIS W/CONTRAST: CPT | Mod: TC

## 2023-02-14 PROCEDURE — 25500020 PHARM REV CODE 255: Performed by: INTERNAL MEDICINE

## 2023-02-14 RX ADMIN — IOPAMIDOL 100 ML: 755 INJECTION, SOLUTION INTRAVENOUS at 03:02

## 2023-02-14 RX ADMIN — DIATRIZOATE MEGLUMINE AND DIATRIZOATE SODIUM 30 ML: 660; 100 LIQUID ORAL; RECTAL at 02:02

## 2023-03-28 NOTE — PROGRESS NOTES
Subjective:       Patient ID: Shalini Alcaraz is a 82 y.o. female.  GYN/Oncology: Dr. 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  Surgery/pathology:  1. 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 (vast majority of tumor involves the exophytic portion of the polyp, but there is a small area of myometrial invasion at the base, tumor cells are positive for P16 and negative for P53).  Imagin. Pelvic US 19--uterus measures 4.8X3.8X8cm, thickened endometrium 2cm, right ovary 1.1cm, left ovary 2.4cm, left pelvic kidney and parapelvic cyst 4.1cm.  2. CT C/A/P 7/3/19 done at [facility] showed no evidence of metastatic disease, exophytic uterine lesion, likely pedunculated fibroid 2.9cm, few subcentimeter pulmonary nodules, etiology uncertain, follow-up needed, s/p cardiac surgery including MVR and ligation left atrial appendage, atherosclerosis, s/p cholecystectomy, ectopic left kidney in pelvis.  3. CT of chest on 19--Negative for pulmonary thromboembolic disease.  Mosaic appearance of the lung parenchyma could be seen with edema or small vessel disease. Left lower lobe 6 mm nodule. Recommend attention on cancer surveillance scans.  4. CT chest w/ contrast 20--Stable left lower lobe nodule. No new suspicious findings.  5. CT chest w/ contrast 21--Stable 7 mm nodule in the lower lobe of the left lung, appears to be a mild increase in peripheral interstitial disease/fibrotic change in  the bilateral lungs. Atypical infection is  not excluded.  6. CT C/A/P done 2/23/22--Stable 8mm non-calcified left lower lobe pulmonary nodule and several small subcentimeter pleural nodules along the right upper lobe pleural surface, additional chronic incidental findings including emphysema and diffuse atherosclerotic disease, no acute pathology or metastatic disease identified at the abdomen or pelvis. Chronic findings as detailed above including left pelvic kidney. Post cholecystectomy, appendectomy, and hysterectomy.  7. CT chest w/o contrast 8/12/22--Newly visualized areas of irregular nodular consolidation and surrounding ground-glass attenuation scattered through both lungs are nonspecific, could represent sequela of acute infectious or inflammatory process.  Close attention on follow-up imaging is needed in order to ensure resolution, otherwise, scattered nodular foci through the lungs visualized on the prior study are without significant interval change and there are no new or worsening findings to suggest definite metastatic progression, chronic secondary details are without significant change from the 23 February 2022 appearance.  8. CT chest w/o contrast done 11/11/22--Resolving patchy interstitial infiltrates in the lungs bilaterally suggesting resolving inflammatory process, chronic interstitial lung changes bilaterally and punctate areas of scattered nodularity bilaterally overall stable since prior examination.  9. CT C/A/P 2/14/23--Unchanged pulmonary nodules.  Largest nodule is seen in the left lower lobe measuring 8 mm, slight interval change in size of sub threshold abdominopelvic lymph nodes.  For example left common iliac lymph nodes are decreased in size from previous exam.  Perirectal lymph node is increased in size from prior exam.  Lymph nodes are not enlarged by size criteria, mildly thickened appearance of the bladder wall may be related to under distension, post treatment change or  cystitis.     S/p Left CW mediport insertion by Dr. Brown on 9/20/19. Removed on 9/14/21.     CA-125:  02/15/22--47.4  04/13/22--33.3  08/12/22--42.6  11/11/22--36.6  02/13/23--35.3    Treatment history:  Vaginal cuff brachytherapy X 3 treatments completed 11/11/19--11/18/19.  Carboplatin/Paclitaxel x 4 cycles. 9/24/19 - 11/26/19. Stopped due to SOB and pneumonitis c/w Taxol induced lung injury, resolved with steroids.      Current Treatment: Observation     Chief Complaint: Other Misc (Pt reports no new concerns today.)      HPI   Patient presents for follow-up of h/o endometrial cancer. She is doing well. She missed her follow-up in 2/2023 because she had 2 falls. She is currently still having a cardiac work-up and the plan is for her to have a Watchman procedure. Recent CT from 2/2023 with [facility]NAC[facility] and I went over results with patient and her . CA-125 improved. She is waiting on the cardiac procedure because she needs a knee replacement.     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      docusate sodium  (COLACE) 100 MG capsule Take 100 mg by mouth daily as needed for Constipation.      gabapentin (NEURONTIN) 300 MG capsule Take 300 mg by mouth every evening.   7    HYDROcodone-acetaminophen (NORCO)  mg per tablet TAKE 1 TABLET BY MOUTH EVERY 6 HOURS May cause drowsiness no alcohol  0    metoprolol tartrate (LOPRESSOR) 50 MG tablet Take 0.5 tablets (25 mg total) by mouth every 6 (six) hours. 60 tablet 0    digoxin (LANOXIN) 125 mcg tablet Take 125 mg by mouth once daily.  11    ELIQUIS 5 mg Tab Take 5 mg by mouth 2 (two) times daily.  1    glucosamine/chondr nickerson A sod (OSTEO BI-FLEX ORAL) Take by mouth once daily.      ondansetron (ZOFRAN) 8 MG tablet   See Instructions, PRN PRN as needed for nausea/vomiting, 1 tab(s) Oral TID for 3 days after chemotherapy and q8hr, 0 Refill(s)      ondansetron (ZOFRAN) 8 MG tablet TAKE 1 TABLET BY MOUTH THREE TIMES DAILY FOR 3 DAYS AFTER CHEMOTHERAPY AND EVERY 8 HOURS AS NEEDED FOR NAUSEA  4    oxyCODONE-acetaminophen (PERCOCET) 5-325 mg per tablet Take 1 tablet by mouth every 4 (four) hours as needed. (Patient not taking: Reported on 9/30/2019) 20 tablet 0    prochlorperazine (COMPAZINE) 5 MG tablet Take 5 mg by mouth every 6 (six) hours as needed.  0     No current facility-administered medications on file prior to visit.      Review of Systems   Constitutional:  Negative for appetite change, fatigue, fever and unexpected weight change.   HENT:  Negative for mouth sores.    Eyes: Negative.    Respiratory:  Negative for cough and shortness of breath.    Cardiovascular:  Negative for chest pain and leg swelling.   Gastrointestinal:  Negative for abdominal distention, abdominal pain, constipation, diarrhea, nausea, vomiting and reflux.   Genitourinary:  Negative for difficulty urinating, dysuria and hematuria.   Musculoskeletal:  Negative for arthralgias and back pain.        Knee arthritis   Integumentary:  Negative for rash.   Neurological:  Negative for weakness and  headaches.        Balance problems   Hematological:  Negative for adenopathy.   Psychiatric/Behavioral:  Negative for sleep disturbance. The patient is not nervous/anxious.        Vitals:    03/29/23 0858   BP: (!) 166/107   Pulse: 82   Resp: 14   Temp: 98.1 °F (36.7 °C)       Physical Exam  Constitutional:       Appearance: Normal appearance.   HENT:      Head: Normocephalic.      Nose: Nose normal.      Mouth/Throat:      Mouth: Mucous membranes are moist.   Eyes:      Extraocular Movements: Extraocular movements intact.      Conjunctiva/sclera: Conjunctivae normal.   Cardiovascular:      Rate and Rhythm: Normal rate and regular rhythm.   Pulmonary:      Effort: Pulmonary effort is normal.      Breath sounds: Normal breath sounds.   Abdominal:      General: Bowel sounds are normal. There is no distension.      Palpations: Abdomen is soft.      Tenderness: There is no abdominal tenderness.   Musculoskeletal:         General: Normal range of motion.      Comments: Using a walker, severe left knee swelling   Skin:     General: Skin is warm.   Neurological:      General: No focal deficit present.      Mental Status: She is alert and oriented to person, place, and time.   Psychiatric:         Mood and Affect: Mood normal.         Judgment: Judgment normal.       No visits with results within 2 Week(s) from this visit.   Latest known visit with results is:   Lab Visit on 02/13/2023   Component Date Value    Sodium Level 02/13/2023 141     Potassium Level 02/13/2023 4.7     Chloride 02/13/2023 99     Carbon Dioxide 02/13/2023 28     Glucose Level 02/13/2023 113     Blood Urea Nitrogen 02/13/2023 24.5 (H)     Creatinine 02/13/2023 1.03 (H)     Calcium Level Total 02/13/2023 9.7     Protein Total 02/13/2023 6.9     Albumin Level 02/13/2023 3.6     Globulin 02/13/2023 3.3     Albumin/Globulin Ratio 02/13/2023 1.1     Bilirubin Total 02/13/2023 0.3     Alkaline Phosphatase 02/13/2023 72     Alanine Aminotransferase  02/13/2023 17     Aspartate Aminotransfera* 02/13/2023 14     eGFR 02/13/2023 55     Cancer Antigen 125 02/13/2023 35.5 (H)     WBC 02/13/2023 9.6     RBC 02/13/2023 4.62     Hgb 02/13/2023 13.3     Hct 02/13/2023 43.4     MCV 02/13/2023 93.9     MCH 02/13/2023 28.8     MCHC 02/13/2023 30.6 (L)     RDW 02/13/2023 12.5     Platelet 02/13/2023 290     MPV 02/13/2023 8.8     Neut % 02/13/2023 68.3     Lymph % 02/13/2023 22.9     Mono % 02/13/2023 5.9     Eos % 02/13/2023 2.0     Basophil % 02/13/2023 0.4     Lymph # 02/13/2023 2.19     Neut # 02/13/2023 6.54     Mono # 02/13/2023 0.56     Eos # 02/13/2023 0.19     Baso # 02/13/2023 0.04     IG# 02/13/2023 0.05 (H)     IG% 02/13/2023 0.5           Assessment:       1. Papillary serous endometrial adenocarcinoma      Plan:       Patient with endometrial carcinoma, serous carcinoma, stage IA s/p surgery done 7/31/19.  Tumor was arising in a polyp with a small amount of myometrial invasion.  Per NCCN guidelines, treatment with systemic therapy plus vaginal brachytherapy is preferred.  Dr. Quesada has recommended 6 cycles of Carboplatin/Paclitaxel plus vaginal brachytherapy and I agree with treatment plan.     Started Carbo/Taxol on 9/24/19. Completed her 4th cycle on 11/26/19.   Started experiencing extreme fatigue and NAGEL. Negative work-up by her cardiologist.   CT of chest on 12/5/19--Negative for pulmonary thromboembolic disease.  Mosaic appearance of the lung parenchyma could be seen with edema or small vessel disease. Left lower lobe 6 mm nodule. Recommend attention on cancer surveillance scans.  SOB continued despite Lasix for a few days. Patient treated with oral Prednisone taper and symptoms have now resolved. Suspect this was c/w Taxol pneumonitis.  Chemotherapy discontinued.     Currently patient is doing well without any signs or symptoms to suggest disease recurrence.  Her CA-125 was recently elevated at 47.4 in 2/2022, improved in 4/2022, elevated again in  8/2022.  CT chest w/o contrast 8/12/22 showed development of some new nodular areas, appear to be semi-solid and likely inflammatory. None look really amendable to biopsy, suspect would be low-yield but will refer to pulmonary for evaluation.  CT chest w/o contrast done 11/11/22 improved.  CT C/A/P done 2/14/23 with stable lung findings, some prominent abdominal nodes, some improved, but not enlarged.     Labs show CA-125 also improved but still slightly elevated. Labs otherwise good.     Will have patient RTC in 5 months for follow-up with repeat labs and repeat CT C/A/P.  If good, plan to continue with every 6 months follow-up visits and can continue scans up to 5 years.     Patient has not made it back to see Dr. Quesada due to COVID.  She has been followed by local GYN - Dr. Pires.      All questions answered at this time.     Annie Leiva MD

## 2023-03-29 ENCOUNTER — OFFICE VISIT (OUTPATIENT)
Dept: HEMATOLOGY/ONCOLOGY | Facility: CLINIC | Age: 83
End: 2023-03-29
Payer: MEDICARE

## 2023-03-29 VITALS
BODY MASS INDEX: 25.73 KG/M2 | RESPIRATION RATE: 14 BRPM | HEART RATE: 82 BPM | DIASTOLIC BLOOD PRESSURE: 107 MMHG | HEIGHT: 71 IN | SYSTOLIC BLOOD PRESSURE: 166 MMHG | TEMPERATURE: 98 F | OXYGEN SATURATION: 95 % | WEIGHT: 183.81 LBS

## 2023-03-29 DIAGNOSIS — C54.1 PAPILLARY SEROUS ENDOMETRIAL ADENOCARCINOMA: Primary | ICD-10-CM

## 2023-03-29 PROCEDURE — 99214 PR OFFICE/OUTPT VISIT, EST, LEVL IV, 30-39 MIN: ICD-10-PCS | Mod: S$PBB,,, | Performed by: INTERNAL MEDICINE

## 2023-03-29 PROCEDURE — 99214 OFFICE O/P EST MOD 30 MIN: CPT | Mod: PBBFAC | Performed by: INTERNAL MEDICINE

## 2023-03-29 PROCEDURE — 99999 PR PBB SHADOW E&M-EST. PATIENT-LVL IV: ICD-10-PCS | Mod: PBBFAC,,, | Performed by: INTERNAL MEDICINE

## 2023-03-29 PROCEDURE — 99214 OFFICE O/P EST MOD 30 MIN: CPT | Mod: S$PBB,,, | Performed by: INTERNAL MEDICINE

## 2023-03-29 PROCEDURE — 99999 PR PBB SHADOW E&M-EST. PATIENT-LVL IV: CPT | Mod: PBBFAC,,, | Performed by: INTERNAL MEDICINE

## 2023-03-29 RX ORDER — ASPIRIN 81 MG
81 TABLET,CHEWABLE ORAL DAILY
COMMUNITY
Start: 2023-03-15 | End: 2024-04-02

## 2023-08-24 ENCOUNTER — HOSPITAL ENCOUNTER (OUTPATIENT)
Dept: RADIOLOGY | Facility: HOSPITAL | Age: 83
Discharge: HOME | End: 2023-08-24
Attending: INTERNAL MEDICINE
Payer: MEDICARE

## 2023-08-24 DIAGNOSIS — C54.1 PAPILLARY SEROUS ENDOMETRIAL ADENOCARCINOMA: ICD-10-CM

## 2023-08-24 LAB
CREAT SERPL-MCNC: 1 MG/DL (ref 0.5–1.4)
SAMPLE: NORMAL

## 2023-08-24 PROCEDURE — 71260 CT THORAX DX C+: CPT | Mod: TC

## 2023-08-24 PROCEDURE — 74177 CT ABD & PELVIS W/CONTRAST: CPT | Mod: TC

## 2023-08-24 PROCEDURE — 25500020 PHARM REV CODE 255: Performed by: INTERNAL MEDICINE

## 2023-08-24 RX ADMIN — IOPAMIDOL 100 ML: 755 INJECTION, SOLUTION INTRAVENOUS at 02:08

## 2023-08-24 RX ADMIN — DIATRIZOATE MEGLUMINE AND DIATRIZOATE SODIUM 30 ML: 660; 100 LIQUID ORAL; RECTAL at 02:08

## 2023-08-28 NOTE — PROGRESS NOTES
Subjective:       Patient ID: Shalini Alcaraz is a 82 y.o. female.  GYN/Oncology: Dr. 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  Surgery/pathology:  1. 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 (vast majority of tumor involves the exophytic portion of the polyp, but there is a small area of myometrial invasion at the base, tumor cells are positive for P16 and negative for P53).  Imagin. Pelvic US 19--uterus measures 4.8X3.8X8cm, thickened endometrium 2cm, right ovary 1.1cm, left ovary 2.4cm, left pelvic kidney and parapelvic cyst 4.1cm.  2. CT C/A/P 7/3/19 done at [facility] showed no evidence of metastatic disease, exophytic uterine lesion, likely pedunculated fibroid 2.9cm, few subcentimeter pulmonary nodules, etiology uncertain, follow-up needed, s/p cardiac surgery including MVR and ligation left atrial appendage, atherosclerosis, s/p cholecystectomy, ectopic left kidney in pelvis.  3. CT of chest on 19--Negative for pulmonary thromboembolic disease.  Mosaic appearance of the lung parenchyma could be seen with edema or small vessel disease. Left lower lobe 6 mm nodule. Recommend attention on cancer surveillance scans.  4. CT chest w/ contrast 20--Stable left lower lobe nodule. No new suspicious findings.  5. CT chest w/ contrast 21--Stable 7 mm nodule in the lower lobe of the left lung, appears to be a mild increase in peripheral interstitial disease/fibrotic change in  the bilateral lungs. Atypical infection is  not excluded.  6. CT C/A/P done 2/23/22--Stable 8mm non-calcified left lower lobe pulmonary nodule and several small subcentimeter pleural nodules along the right upper lobe pleural surface, additional chronic incidental findings including emphysema and diffuse atherosclerotic disease, no acute pathology or metastatic disease identified at the abdomen or pelvis. Chronic findings as detailed above including left pelvic kidney. Post cholecystectomy, appendectomy, and hysterectomy.  7. CT chest w/o contrast 8/12/22--Newly visualized areas of irregular nodular consolidation and surrounding ground-glass attenuation scattered through both lungs are nonspecific, could represent sequela of acute infectious or inflammatory process.  Close attention on follow-up imaging is needed in order to ensure resolution, otherwise, scattered nodular foci through the lungs visualized on the prior study are without significant interval change and there are no new or worsening findings to suggest definite metastatic progression, chronic secondary details are without significant change from the 23 February 2022 appearance.  8. CT chest w/o contrast done 11/11/22--Resolving patchy interstitial infiltrates in the lungs bilaterally suggesting resolving inflammatory process, chronic interstitial lung changes bilaterally and punctate areas of scattered nodularity bilaterally overall stable since prior examination.  9. CT C/A/P 2/14/23--Unchanged pulmonary nodules.  Largest nodule is seen in the left lower lobe measuring 8 mm, slight interval change in size of sub threshold abdominopelvic lymph nodes.  For example left common iliac lymph nodes are decreased in size from previous exam.  Perirectal lymph node is increased in size from prior exam.  Lymph nodes are not enlarged by size criteria, mildly thickened appearance of the bladder wall may be related to under distension, post treatment change or  cystitis.  10. CT C/A/P done 8/24/23--stable 8mm solid nodule LLL, several smaller bilateral nodules are not significantly changed, No new or worsening malignant findings identified since February.     S/p Left CW mediport insertion by Dr. Brown on 9/20/19. Removed on 9/14/21.     CA-125:  02/15/22--47.4  04/13/22--33.3  08/12/22--42.6  11/11/22--36.6  02/13/23--35.3  08/24/23--26.2    Treatment history:  Vaginal cuff brachytherapy X 3 treatments completed 11/11/19--11/18/19.  Carboplatin/Paclitaxel x 4 cycles. 9/24/19 - 11/26/19. Stopped due to SOB and pneumonitis c/w Taxol induced lung injury, resolved with steroids.      Current Treatment: Observation     Chief Complaint: Other Misc (Pt reports that she is having knee replacement on 9/21 and is starting to have anxiety about the sx.)    HPI   Patient presents for follow-up of h/o endometrial cancer. She is doing well. She reports that she will finally have her knee surgery on 9/21/23. Dr. Negrete is doing the left knee first and then the right if everything goes well. She has been cleared by pulmonary, cardiology and will see her PCP next week for clearance. She is having some anxiety about the surgery. Recent CT is all good.     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      docusate sodium (COLACE) 100 MG capsule Take 100 mg by mouth daily as needed for Constipation.      gabapentin (NEURONTIN) 300 MG capsule Take 300 mg by mouth every evening.   7    HYDROcodone-acetaminophen (NORCO)  mg per tablet TAKE 1 TABLET BY MOUTH EVERY 6 HOURS May cause drowsiness no alcohol  0    metoprolol tartrate (LOPRESSOR) 50 MG tablet Take 0.5 tablets (25 mg total) by mouth every 6 (six) hours. 60 tablet 0    multivitamin-iron-folic acid Tab Take 1 tablet by mouth every morning.      digoxin (LANOXIN) 125 mcg tablet Take 125 mg by mouth once daily.  11    ELIQUIS 5 mg Tab Take 5 mg by mouth 2 (two) times daily.  1    glucosamine/chondr nickerson A sod (OSTEO BI-FLEX ORAL) Take by mouth once daily.      ondansetron (ZOFRAN) 8 MG tablet   See Instructions, PRN PRN as needed for nausea/vomiting, 1 tab(s) Oral TID for 3 days after chemotherapy and q8hr, 0 Refill(s)      ondansetron (ZOFRAN) 8 MG tablet TAKE 1 TABLET BY MOUTH THREE TIMES DAILY FOR 3 DAYS AFTER CHEMOTHERAPY AND EVERY 8 HOURS AS NEEDED FOR NAUSEA  4    oxyCODONE-acetaminophen (PERCOCET) 5-325 mg per tablet Take 1 tablet by mouth every 4 (four) hours as needed. (Patient not taking: Reported on 9/30/2019) 20 tablet 0    prochlorperazine (COMPAZINE) 5 MG tablet Take 5 mg by mouth every 6 (six) hours as needed.  0     No current facility-administered medications on file prior to visit.      Review of Systems   Constitutional:  Negative for appetite change, fatigue, fever and unexpected weight change.   HENT:  Negative for mouth sores.    Eyes: Negative.    Respiratory:  Negative for cough and shortness of breath.    Cardiovascular:  Negative for chest pain and leg swelling.   Gastrointestinal:  Negative for abdominal distention,  abdominal pain, constipation, diarrhea, nausea, vomiting and reflux.   Genitourinary:  Negative for difficulty urinating, dysuria and hematuria.   Musculoskeletal:  Negative for arthralgias and back pain.        Knee arthritis   Integumentary:  Negative for rash.   Neurological:  Negative for weakness and headaches.        Balance problems   Hematological:  Negative for adenopathy.   Psychiatric/Behavioral:  Negative for sleep disturbance. The patient is not nervous/anxious.          Vitals:    08/30/23 1043   BP: 123/82   Pulse: 87   Resp: 14   Temp: 97.8 °F (36.6 °C)         Physical Exam  Constitutional:       Appearance: Normal appearance.   HENT:      Head: Normocephalic.      Nose: Nose normal.      Mouth/Throat:      Mouth: Mucous membranes are moist.   Eyes:      Extraocular Movements: Extraocular movements intact.      Conjunctiva/sclera: Conjunctivae normal.   Cardiovascular:      Rate and Rhythm: Normal rate and regular rhythm.   Pulmonary:      Effort: Pulmonary effort is normal.      Breath sounds: Normal breath sounds.   Abdominal:      General: Bowel sounds are normal. There is no distension.      Palpations: Abdomen is soft.      Tenderness: There is no abdominal tenderness.   Musculoskeletal:         General: Normal range of motion.      Comments: Using a walker, severe right knee swelling   Skin:     General: Skin is warm.   Neurological:      General: No focal deficit present.      Mental Status: She is alert and oriented to person, place, and time.   Psychiatric:         Mood and Affect: Mood normal.         Judgment: Judgment normal.         Hospital Outpatient Visit on 08/24/2023   Component Date Value    POC Creatinine 08/24/2023 1.0     Sample 08/24/2023 unknown    Lab Visit on 08/24/2023   Component Date Value    Sodium Level 08/24/2023 136     Potassium Level 08/24/2023 4.5     Chloride 08/24/2023 100     Carbon Dioxide 08/24/2023 27     Glucose Level 08/24/2023 86     Blood Urea Nitrogen  08/24/2023 20.1     Creatinine 08/24/2023 0.92     Calcium Level Total 08/24/2023 9.6     Protein Total 08/24/2023 6.8     Albumin Level 08/24/2023 3.8     Globulin 08/24/2023 3.0     Albumin/Globulin Ratio 08/24/2023 1.3     Bilirubin Total 08/24/2023 0.4     Alkaline Phosphatase 08/24/2023 77     Alanine Aminotransferase 08/24/2023 12     Aspartate Aminotransfera* 08/24/2023 18     eGFR 08/24/2023 >60     Cancer Antigen 125 08/24/2023 26.2     WBC 08/24/2023 8.13     RBC 08/24/2023 4.44     Hgb 08/24/2023 13.5     Hct 08/24/2023 42.2     MCV 08/24/2023 95.0 (H)     MCH 08/24/2023 30.4     MCHC 08/24/2023 32.0 (L)     RDW 08/24/2023 12.8     Platelet 08/24/2023 222     MPV 08/24/2023 8.7     Neut % 08/24/2023 66.1     Lymph % 08/24/2023 23.7     Mono % 08/24/2023 7.0     Eos % 08/24/2023 2.3     Basophil % 08/24/2023 0.5     Lymph # 08/24/2023 1.93     Neut # 08/24/2023 5.37     Mono # 08/24/2023 0.57     Eos # 08/24/2023 0.19     Baso # 08/24/2023 0.04     IG# 08/24/2023 0.03     IG% 08/24/2023 0.4           Assessment:       1. Papillary serous endometrial adenocarcinoma        Plan:       Patient with endometrial carcinoma, serous carcinoma, stage IA s/p surgery done 7/31/19.  Tumor was arising in a polyp with a small amount of myometrial invasion.  Per NCCN guidelines, treatment with systemic therapy plus vaginal brachytherapy is preferred.  Dr. Quesada has recommended 6 cycles of Carboplatin/Paclitaxel plus vaginal brachytherapy and I agree with treatment plan.     Started Carbo/Taxol on 9/24/19. Completed her 4th cycle on 11/26/19.   Started experiencing extreme fatigue and NAGEL. Negative work-up by her cardiologist.   CT of chest on 12/5/19--Negative for pulmonary thromboembolic disease.  Mosaic appearance of the lung parenchyma could be seen with edema or small vessel disease. Left lower lobe 6 mm nodule. Recommend attention on cancer surveillance scans.  SOB continued despite Lasix for a few days. Patient  treated with oral Prednisone taper and symptoms have now resolved. Suspect this was c/w Taxol pneumonitis.  Chemotherapy discontinued.     Currently patient is doing well without any signs or symptoms to suggest disease recurrence.  Her CA-125 elevated at 47.4 in 2/2022, improved in 4/2022, elevated again in 8/2022.  CT chest w/o contrast 8/12/22 showed development of some new nodular areas, appear to be semi-solid and likely inflammatory. None look really amendable to biopsy, suspect would be low-yield but will refer to pulmonary for evaluation.  CT chest w/o contrast done 11/11/22 improved.  CT C/A/P done 2/14/23 with stable lung findings, some prominent abdominal nodes, some improved, but not enlarged.   CT C/A/P done 8/24/23 with stable findings.     Labs show CA-125 WNL and other labs good.    Will have patient RTC in 6 months for follow-up with repeat labs.  Plan to repeat CT C/A/P in 1 year and if good, will be able to discontinue scans.     Patient has not made it back to see Dr. Quesada due to COVID.  She has been followed by local GYN - Dr. Pires.      All questions answered at this time.     Annie Leiva MD

## 2023-08-30 ENCOUNTER — OFFICE VISIT (OUTPATIENT)
Dept: HEMATOLOGY/ONCOLOGY | Facility: CLINIC | Age: 83
End: 2023-08-30
Payer: MEDICARE

## 2023-08-30 VITALS
WEIGHT: 182.81 LBS | RESPIRATION RATE: 14 BRPM | TEMPERATURE: 98 F | HEART RATE: 87 BPM | HEIGHT: 70 IN | DIASTOLIC BLOOD PRESSURE: 82 MMHG | SYSTOLIC BLOOD PRESSURE: 123 MMHG | OXYGEN SATURATION: 97 % | BODY MASS INDEX: 26.17 KG/M2

## 2023-08-30 DIAGNOSIS — C54.1 PAPILLARY SEROUS ENDOMETRIAL ADENOCARCINOMA: Primary | ICD-10-CM

## 2023-08-30 PROCEDURE — 99214 OFFICE O/P EST MOD 30 MIN: CPT | Mod: PBBFAC | Performed by: INTERNAL MEDICINE

## 2023-08-30 PROCEDURE — 99999 PR PBB SHADOW E&M-EST. PATIENT-LVL IV: CPT | Mod: PBBFAC,,, | Performed by: INTERNAL MEDICINE

## 2023-08-30 PROCEDURE — 99214 OFFICE O/P EST MOD 30 MIN: CPT | Mod: S$PBB,,, | Performed by: INTERNAL MEDICINE

## 2023-08-30 PROCEDURE — 99214 PR OFFICE/OUTPT VISIT, EST, LEVL IV, 30-39 MIN: ICD-10-PCS | Mod: S$PBB,,, | Performed by: INTERNAL MEDICINE

## 2023-08-30 PROCEDURE — 99999 PR PBB SHADOW E&M-EST. PATIENT-LVL IV: ICD-10-PCS | Mod: PBBFAC,,, | Performed by: INTERNAL MEDICINE

## 2024-03-14 ENCOUNTER — TELEPHONE (OUTPATIENT)
Dept: HEMATOLOGY/ONCOLOGY | Facility: CLINIC | Age: 84
End: 2024-03-14
Payer: MEDICARE

## 2024-03-28 ENCOUNTER — LAB VISIT (OUTPATIENT)
Dept: LAB | Facility: HOSPITAL | Age: 84
End: 2024-03-28
Attending: INTERNAL MEDICINE
Payer: MEDICARE

## 2024-03-28 DIAGNOSIS — C54.1 PAPILLARY SEROUS ENDOMETRIAL ADENOCARCINOMA: ICD-10-CM

## 2024-03-28 LAB
ALBUMIN SERPL-MCNC: 3.6 G/DL (ref 3.4–4.8)
ALBUMIN/GLOB SERPL: 1.2 RATIO (ref 1.1–2)
ALP SERPL-CCNC: 77 UNIT/L (ref 40–150)
ALT SERPL-CCNC: 14 UNIT/L (ref 0–55)
AST SERPL-CCNC: 18 UNIT/L (ref 5–34)
BASOPHILS # BLD AUTO: 0.04 X10(3)/MCL
BASOPHILS NFR BLD AUTO: 0.5 %
BILIRUB SERPL-MCNC: 0.5 MG/DL
BUN SERPL-MCNC: 23.5 MG/DL (ref 9.8–20.1)
CALCIUM SERPL-MCNC: 9.2 MG/DL (ref 8.4–10.2)
CANCER AG125 SERPL-ACNC: 36.6 UNIT/ML (ref 0–35)
CHLORIDE SERPL-SCNC: 106 MMOL/L (ref 98–107)
CO2 SERPL-SCNC: 28 MMOL/L (ref 23–31)
CREAT SERPL-MCNC: 0.9 MG/DL (ref 0.55–1.02)
EOSINOPHIL # BLD AUTO: 0.17 X10(3)/MCL (ref 0–0.9)
EOSINOPHIL NFR BLD AUTO: 2 %
ERYTHROCYTE [DISTWIDTH] IN BLOOD BY AUTOMATED COUNT: 14.1 % (ref 11.5–17)
GFR SERPLBLD CREATININE-BSD FMLA CKD-EPI: >60 MLS/MIN/1.73/M2
GLOBULIN SER-MCNC: 3 GM/DL (ref 2.4–3.5)
GLUCOSE SERPL-MCNC: 73 MG/DL (ref 82–115)
HCT VFR BLD AUTO: 44.3 % (ref 37–47)
HGB BLD-MCNC: 14.4 G/DL (ref 12–16)
IMM GRANULOCYTES # BLD AUTO: 0.04 X10(3)/MCL (ref 0–0.04)
IMM GRANULOCYTES NFR BLD AUTO: 0.5 %
LYMPHOCYTES # BLD AUTO: 1.49 X10(3)/MCL (ref 0.6–4.6)
LYMPHOCYTES NFR BLD AUTO: 17.2 %
MCH RBC QN AUTO: 30.6 PG (ref 27–31)
MCHC RBC AUTO-ENTMCNC: 32.5 G/DL (ref 33–36)
MCV RBC AUTO: 94.1 FL (ref 80–94)
MONOCYTES # BLD AUTO: 0.7 X10(3)/MCL (ref 0.1–1.3)
MONOCYTES NFR BLD AUTO: 8.1 %
NEUTROPHILS # BLD AUTO: 6.24 X10(3)/MCL (ref 2.1–9.2)
NEUTROPHILS NFR BLD AUTO: 71.7 %
PLATELET # BLD AUTO: 158 X10(3)/MCL (ref 130–400)
PMV BLD AUTO: 8.9 FL (ref 7.4–10.4)
POTASSIUM SERPL-SCNC: 4.5 MMOL/L (ref 3.5–5.1)
PROT SERPL-MCNC: 6.6 GM/DL (ref 5.8–7.6)
RBC # BLD AUTO: 4.71 X10(6)/MCL (ref 4.2–5.4)
SODIUM SERPL-SCNC: 141 MMOL/L (ref 136–145)
WBC # SPEC AUTO: 8.68 X10(3)/MCL (ref 4.5–11.5)

## 2024-03-28 PROCEDURE — 86304 IMMUNOASSAY TUMOR CA 125: CPT

## 2024-03-28 PROCEDURE — 36415 COLL VENOUS BLD VENIPUNCTURE: CPT

## 2024-03-28 PROCEDURE — 85025 COMPLETE CBC W/AUTO DIFF WBC: CPT

## 2024-03-28 PROCEDURE — 80053 COMPREHEN METABOLIC PANEL: CPT

## 2024-04-02 ENCOUNTER — OFFICE VISIT (OUTPATIENT)
Dept: HEMATOLOGY/ONCOLOGY | Facility: CLINIC | Age: 84
End: 2024-04-02
Payer: MEDICARE

## 2024-04-02 VITALS
SYSTOLIC BLOOD PRESSURE: 124 MMHG | HEIGHT: 70 IN | BODY MASS INDEX: 25.01 KG/M2 | DIASTOLIC BLOOD PRESSURE: 84 MMHG | OXYGEN SATURATION: 97 % | WEIGHT: 174.69 LBS | HEART RATE: 75 BPM | TEMPERATURE: 98 F | RESPIRATION RATE: 20 BRPM

## 2024-04-02 DIAGNOSIS — C54.1 PAPILLARY SEROUS ENDOMETRIAL ADENOCARCINOMA: Primary | ICD-10-CM

## 2024-04-02 PROCEDURE — 99999 PR PBB SHADOW E&M-EST. PATIENT-LVL IV: CPT | Mod: PBBFAC,,,

## 2024-04-02 PROCEDURE — 99214 OFFICE O/P EST MOD 30 MIN: CPT | Mod: S$PBB,,,

## 2024-04-02 PROCEDURE — 99214 OFFICE O/P EST MOD 30 MIN: CPT | Mod: PBBFAC

## 2024-04-02 RX ORDER — ROSUVASTATIN CALCIUM 5 MG/1
TABLET, COATED ORAL
COMMUNITY

## 2024-04-02 RX ORDER — LOSARTAN POTASSIUM 25 MG/1
25 TABLET ORAL
COMMUNITY
Start: 2024-03-06

## 2024-04-02 NOTE — PROGRESS NOTES
Subjective:       Patient ID: Shalini Alcaraz is a 83 y.o. female.  GYN/Oncology: Dr. 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  Surgery/pathology:  1. 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 (vast majority of tumor involves the exophytic portion of the polyp, but there is a small area of myometrial invasion at the base, tumor cells are positive for P16 and negative for P53).  Imagin. Pelvic US 19--uterus measures 4.8X3.8X8cm, thickened endometrium 2cm, right ovary 1.1cm, left ovary 2.4cm, left pelvic kidney and parapelvic cyst 4.1cm.  2. CT C/A/P 7/3/19 done at [facility] showed no evidence of metastatic disease, exophytic uterine lesion, likely pedunculated fibroid 2.9cm, few subcentimeter pulmonary nodules, etiology uncertain, follow-up needed, s/p cardiac surgery including MVR and ligation left atrial appendage, atherosclerosis, s/p cholecystectomy, ectopic left kidney in pelvis.  3. CT of chest on 19--Negative for pulmonary thromboembolic disease.  Mosaic appearance of the lung parenchyma could be seen with edema or small vessel disease. Left lower lobe 6 mm nodule. Recommend attention on cancer surveillance scans.  4. CT chest w/ contrast 20--Stable left lower lobe nodule. No new suspicious findings.  5. CT chest w/ contrast 21--Stable 7 mm nodule in the lower lobe of the left lung, appears to be a mild increase in peripheral interstitial disease/fibrotic change in  the bilateral lungs. Atypical infection is  not excluded.  6. CT C/A/P done 2/23/22--Stable 8mm non-calcified left lower lobe pulmonary nodule and several small subcentimeter pleural nodules along the right upper lobe pleural surface, additional chronic incidental findings including emphysema and diffuse atherosclerotic disease, no acute pathology or metastatic disease identified at the abdomen or pelvis. Chronic findings as detailed above including left pelvic kidney. Post cholecystectomy, appendectomy, and hysterectomy.  7. CT chest w/o contrast 8/12/22--Newly visualized areas of irregular nodular consolidation and surrounding ground-glass attenuation scattered through both lungs are nonspecific, could represent sequela of acute infectious or inflammatory process.  Close attention on follow-up imaging is needed in order to ensure resolution, otherwise, scattered nodular foci through the lungs visualized on the prior study are without significant interval change and there are no new or worsening findings to suggest definite metastatic progression, chronic secondary details are without significant change from the 23 February 2022 appearance.  8. CT chest w/o contrast done 11/11/22--Resolving patchy interstitial infiltrates in the lungs bilaterally suggesting resolving inflammatory process, chronic interstitial lung changes bilaterally and punctate areas of scattered nodularity bilaterally overall stable since prior examination.  9. CT C/A/P 2/14/23--Unchanged pulmonary nodules.  Largest nodule is seen in the left lower lobe measuring 8 mm, slight interval change in size of sub threshold abdominopelvic lymph nodes.  For example left common iliac lymph nodes are decreased in size from previous exam.  Perirectal lymph node is increased in size from prior exam.  Lymph nodes are not enlarged by size criteria, mildly thickened appearance of the bladder wall may be related to under distension, post treatment change or  cystitis.  10. CT C/A/P done 8/24/23--stable 8mm solid nodule LLL, several smaller bilateral nodules are not significantly changed, No new or worsening malignant findings identified since February.     S/p Left CW mediport insertion by Dr. Brown on 9/20/19. Removed on 9/14/21.     CA-125:  02/15/22--47.4  04/13/22--33.3  08/12/22--42.6  11/11/22--36.6  02/13/23--35.3  08/24/23--26.2  03/28/24--36.6    Treatment history:  Vaginal cuff brachytherapy X 3 treatments completed 11/11/19--11/18/19.  Carboplatin/Paclitaxel x 4 cycles. 9/24/19 - 11/26/19. Stopped due to SOB and pneumonitis c/w Taxol induced lung injury, resolved with steroids.      Current Treatment: Observation     Chief Complaint: 6 Month Follow Up    HPI   Patient presents for follow-up of h/o endometrial cancer. She is doing well. She  had knee surgery on 9/21/23 and is ambulating with a walker. She had a fall about three weeks ago and has a bruise to her face. She denies any abdominal pain, change in bowel pattern, new pain, blood in urine/stool.     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  1    gabapentin (NEURONTIN) 300 MG capsule Take 300 mg by mouth every evening.   7    HYDROcodone-acetaminophen (NORCO)  mg per tablet TAKE 1 TABLET BY MOUTH EVERY 6 HOURS May cause drowsiness no alcohol  0    losartan (COZAAR) 25 MG tablet Take 25 mg by mouth.      rosuvastatin (CRESTOR) 5 MG tablet TAKE 1 TABLET BY MOUTH ON MONDAY, WEDNESDAY, AND FRIDAY AS DIRECTED      digoxin (LANOXIN) 125 mcg tablet Take 125 mg by mouth once daily.  11    metoprolol tartrate (LOPRESSOR) 50 MG tablet Take 0.5 tablets (25 mg total) by mouth every 6 (six) hours. 60 tablet 0    [DISCONTINUED] ASPIRIN CHILDRENS 81 mg Chew Take 81 mg by mouth Daily.      [DISCONTINUED] docusate sodium (COLACE) 100 MG capsule Take 100 mg by mouth daily as needed for Constipation.      [DISCONTINUED] glucosamine/chondr nickerson A sod (OSTEO BI-FLEX ORAL) Take by mouth once daily.      [DISCONTINUED] multivitamin-iron-folic acid Tab Take 1 tablet by mouth every morning.      [DISCONTINUED] ondansetron (ZOFRAN) 8 MG tablet   See Instructions, PRN PRN as needed for nausea/vomiting, 1 tab(s) Oral TID for 3 days after chemotherapy and q8hr, 0 Refill(s)      [DISCONTINUED] ondansetron (ZOFRAN) 8 MG tablet TAKE 1 TABLET BY MOUTH THREE TIMES DAILY FOR 3 DAYS AFTER CHEMOTHERAPY AND EVERY 8 HOURS AS NEEDED FOR NAUSEA  4    [DISCONTINUED] oxyCODONE-acetaminophen (PERCOCET) 5-325 mg per tablet Take 1 tablet by mouth every 4 (four) hours as needed. (Patient not taking: Reported on 9/30/2019) 20 tablet 0    [DISCONTINUED] prochlorperazine (COMPAZINE) 5 MG tablet Take 5 mg by mouth every 6 (six) hours as needed.  0     No current facility-administered medications on file prior to visit.      Review of Systems   Constitutional:  Negative for appetite change, fatigue, fever and unexpected weight change.   HENT:  Negative for mouth sores.    Eyes: Negative.    Respiratory:  Negative for cough and shortness of breath.     Cardiovascular:  Negative for chest pain and leg swelling.   Gastrointestinal:  Negative for abdominal distention, abdominal pain, constipation, diarrhea, nausea, vomiting and reflux.   Genitourinary:  Negative for difficulty urinating, dysuria and hematuria.   Musculoskeletal:  Negative for arthralgias and back pain.        Knee arthritis   Integumentary:  Negative for rash.   Neurological:  Negative for weakness and headaches.        Balance problems   Hematological:  Negative for adenopathy.   Psychiatric/Behavioral:  Negative for sleep disturbance. The patient is not nervous/anxious.          There were no vitals filed for this visit.        Physical Exam  Constitutional:       Appearance: Normal appearance.   HENT:      Head: Normocephalic.      Nose: Nose normal.      Mouth/Throat:      Mouth: Mucous membranes are moist.   Eyes:      Extraocular Movements: Extraocular movements intact.      Conjunctiva/sclera: Conjunctivae normal.   Cardiovascular:      Rate and Rhythm: Normal rate and regular rhythm.   Pulmonary:      Effort: Pulmonary effort is normal.      Breath sounds: Normal breath sounds.   Abdominal:      General: Bowel sounds are normal. There is no distension.      Palpations: Abdomen is soft.      Tenderness: There is no abdominal tenderness.   Musculoskeletal:         General: Normal range of motion.      Comments: Using a walker, severe right knee swelling   Skin:     General: Skin is warm.   Neurological:      General: No focal deficit present.      Mental Status: She is alert and oriented to person, place, and time.   Psychiatric:         Mood and Affect: Mood normal.         Judgment: Judgment normal.         Lab Visit on 03/28/2024   Component Date Value    Sodium Level 03/28/2024 141     Potassium Level 03/28/2024 4.5     Chloride 03/28/2024 106     Carbon Dioxide 03/28/2024 28     Glucose Level 03/28/2024 73 (L)     Blood Urea Nitrogen 03/28/2024 23.5 (H)     Creatinine 03/28/2024 0.90      Calcium Level Total 03/28/2024 9.2     Protein Total 03/28/2024 6.6     Albumin Level 03/28/2024 3.6     Globulin 03/28/2024 3.0     Albumin/Globulin Ratio 03/28/2024 1.2     Bilirubin Total 03/28/2024 0.5     Alkaline Phosphatase 03/28/2024 77     Alanine Aminotransferase 03/28/2024 14     Aspartate Aminotransfera* 03/28/2024 18     eGFR 03/28/2024 >60     Cancer Antigen 125 03/28/2024 36.6 (H)     WBC 03/28/2024 8.68     RBC 03/28/2024 4.71     Hgb 03/28/2024 14.4     Hct 03/28/2024 44.3     MCV 03/28/2024 94.1 (H)     MCH 03/28/2024 30.6     MCHC 03/28/2024 32.5 (L)     RDW 03/28/2024 14.1     Platelet 03/28/2024 158     MPV 03/28/2024 8.9     Neut % 03/28/2024 71.7     Lymph % 03/28/2024 17.2     Mono % 03/28/2024 8.1     Eos % 03/28/2024 2.0     Basophil % 03/28/2024 0.5     Lymph # 03/28/2024 1.49     Neut # 03/28/2024 6.24     Mono # 03/28/2024 0.70     Eos # 03/28/2024 0.17     Baso # 03/28/2024 0.04     IG# 03/28/2024 0.04     IG% 03/28/2024 0.5           Assessment:       1. Papillary serous endometrial adenocarcinoma          Plan:       Patient with endometrial carcinoma, serous carcinoma, stage IA s/p surgery done 7/31/19.  Tumor was arising in a polyp with a small amount of myometrial invasion.  Per NCCN guidelines, treatment with systemic therapy plus vaginal brachytherapy is preferred.  Dr. Quesada has recommended 6 cycles of Carboplatin/Paclitaxel plus vaginal brachytherapy and I agree with treatment plan.     Started Carbo/Taxol on 9/24/19. Completed her 4th cycle on 11/26/19.   Started experiencing extreme fatigue and NAGEL. Negative work-up by her cardiologist.   CT of chest on 12/5/19--Negative for pulmonary thromboembolic disease.  Mosaic appearance of the lung parenchyma could be seen with edema or small vessel disease. Left lower lobe 6 mm nodule. Recommend attention on cancer surveillance scans.  SOB continued despite Lasix for a few days. Patient treated with oral Prednisone taper and symptoms  have now resolved. Suspect this was c/w Taxol pneumonitis.  Chemotherapy discontinued.     Currently patient is doing well without any signs or symptoms to suggest disease recurrence.  Her CA-125 elevated at 47.4 in 2/2022, improved in 4/2022, elevated again in 8/2022.  CT chest w/o contrast 8/12/22 showed development of some new nodular areas, appear to be semi-solid and likely inflammatory. None look really amendable to biopsy, suspect would be low-yield but will refer to pulmonary for evaluation.  CT chest w/o contrast done 11/11/22 improved.  CT C/A/P done 2/14/23 with stable lung findings, some prominent abdominal nodes, some improved, but not enlarged.   CT C/A/P done 8/24/23 with stable findings.     Labs show CA-125 mildly elevated at 36.6 and other labs good.    Will have patient RTC in 5 months for follow-up with repeat labs.  repeat CT C/A/P 8/2024, if stable will be able to discontinue scans.     Patient has not made it back to see Dr. Quesada due to COVID.  She has been followed by local GYN - Dr. Pires.      All questions answered at this time.     Annie Leiva MD

## 2024-08-26 PROBLEM — C54.1 PAPILLARY SEROUS ENDOMETRIAL ADENOCARCINOMA: Status: RESOLVED | Noted: 2019-07-06 | Resolved: 2024-08-26

## 2024-08-26 PROBLEM — Z85.42 HISTORY OF ENDOMETRIAL CANCER: Status: ACTIVE | Noted: 2024-08-26

## 2024-08-26 NOTE — PROGRESS NOTES
Subjective:       Patient ID: Shalini Alcaraz is a 83 y.o. female.  GYN/Oncology: Dr. 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  Surgery/pathology:  1. 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 (vast majority of tumor involves the exophytic portion of the polyp, but there is a small area of myometrial invasion at the base, tumor cells are positive for P16 and negative for P53).  Imagin. Pelvic US 19--uterus measures 4.8X3.8X8cm, thickened endometrium 2cm, right ovary 1.1cm, left ovary 2.4cm, left pelvic kidney and parapelvic cyst 4.1cm.  2. CT C/A/P 7/3/19 done at [facility] showed no evidence of metastatic disease, exophytic uterine lesion, likely pedunculated fibroid 2.9cm, few subcentimeter pulmonary nodules, etiology uncertain, follow-up needed, s/p cardiac surgery including MVR and ligation left atrial appendage, atherosclerosis, s/p cholecystectomy, ectopic left kidney in pelvis.  3. CT of chest on 19--Negative for pulmonary thromboembolic disease.  Mosaic appearance of the lung parenchyma could be seen with edema or small vessel disease. Left lower lobe 6 mm nodule. Recommend attention on cancer surveillance scans.  4. CT chest w/ contrast 20--Stable left lower lobe nodule. No new suspicious findings.  5. CT chest w/ contrast 21--Stable 7 mm nodule in the lower lobe of the left lung, appears to be a mild increase in peripheral interstitial disease/fibrotic change in  the bilateral lungs. Atypical infection is  not excluded.  6. CT C/A/P done 2/23/22--Stable 8mm non-calcified left lower lobe pulmonary nodule and several small subcentimeter pleural nodules along the right upper lobe pleural surface, additional chronic incidental findings including emphysema and diffuse atherosclerotic disease, no acute pathology or metastatic disease identified at the abdomen or pelvis. Chronic findings as detailed above including left pelvic kidney. Post cholecystectomy, appendectomy, and hysterectomy.  7. CT chest w/o contrast 8/12/22--Newly visualized areas of irregular nodular consolidation and surrounding ground-glass attenuation scattered through both lungs are nonspecific, could represent sequela of acute infectious or inflammatory process.  Close attention on follow-up imaging is needed in order to ensure resolution, otherwise, scattered nodular foci through the lungs visualized on the prior study are without significant interval change and there are no new or worsening findings to suggest definite metastatic progression, chronic secondary details are without significant change from the 23 February 2022 appearance.  8. CT chest w/o contrast done 11/11/22--Resolving patchy interstitial infiltrates in the lungs bilaterally suggesting resolving inflammatory process, chronic interstitial lung changes bilaterally and punctate areas of scattered nodularity bilaterally overall stable since prior examination.  9. CT C/A/P 2/14/23--Unchanged pulmonary nodules.  Largest nodule is seen in the left lower lobe measuring 8 mm, slight interval change in size of sub threshold abdominopelvic lymph nodes.  For example left common iliac lymph nodes are decreased in size from previous exam.  Perirectal lymph node is increased in size from prior exam.  Lymph nodes are not enlarged by size criteria, mildly thickened appearance of the bladder wall may be related to under distension, post treatment change or  cystitis.  10. CT C/A/P done 8/24/23--stable 8mm solid nodule LLL, several smaller bilateral nodules are not significantly changed, No new or worsening malignant findings identified since February.  11. CT C/A/P done 8/28/24--stable lung nodules, for example LLL 8mm, no suspicious findings.      S/p Left CW mediport insertion by Dr. Brown on 9/20/19. Removed on 9/14/21.     CA-125:  02/15/22--47.4  04/13/22--33.3  08/12/22--42.6  11/11/22--36.6  02/13/23--35.3  08/24/23--26.2  03/28/24--36.6  08/28/24--27.3    Treatment history:  Vaginal cuff brachytherapy X 3 treatments completed 11/11/19--11/18/19.  Carboplatin/Paclitaxel x 4 cycles. 9/24/19 - 11/26/19. Stopped due to SOB and pneumonitis c/w Taxol induced lung injury, resolved with steroids.      Current Treatment: Observation     Chief Complaint: Back Pain    HPI   Patient presents for follow-up of h/o endometrial cancer. She is doing well. Reports having DVT in lower extremity after her knee surgery and is on Eliquis. She also had an elevated heart rate for which she required cardioversion. She has continued follow-up with cardiology. Recent labs good and CT stable.     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  1    gabapentin (NEURONTIN) 300 MG capsule Take 300 mg by mouth every evening.   7    HYDROcodone-acetaminophen (NORCO)  mg per tablet TAKE 1 TABLET BY MOUTH EVERY 6 HOURS May cause drowsiness no alcohol  0    losartan (COZAAR) 25 MG tablet Take 25 mg by mouth.      rosuvastatin (CRESTOR) 5 MG tablet TAKE 1 TABLET BY MOUTH ON MONDAY, WEDNESDAY, AND FRIDAY AS DIRECTED      metoprolol tartrate (LOPRESSOR) 50 MG tablet Take 0.5 tablets (25 mg total) by mouth every 6 (six) hours. (Patient not taking: Reported on 9/4/2024) 60 tablet 0     No current facility-administered medications on file prior to visit.      Review of Systems   Constitutional:  Negative for appetite change, fatigue, fever and unexpected weight change.   HENT:  Negative for mouth sores.    Eyes: Negative.    Respiratory:  Negative for cough and shortness of breath.    Cardiovascular:  Negative for chest pain and leg swelling.   Gastrointestinal:  Negative for abdominal distention, abdominal pain, constipation, diarrhea, nausea, vomiting and reflux.   Genitourinary:  Negative for difficulty urinating, dysuria and hematuria.   Musculoskeletal:  Negative for arthralgias and back pain.        Knee arthritis   Integumentary:  Negative for rash.   Neurological:  Negative for weakness and headaches.        Balance problems   Hematological:  Negative for adenopathy.   Psychiatric/Behavioral:  Negative for sleep disturbance. The patient is not nervous/anxious.          Vitals:    09/04/24 1144   BP: (!) 162/83   Pulse: 67   Resp: 14   Temp: 98.3 °F (36.8 °C)           Physical Exam  Constitutional:       Appearance: Normal appearance.   HENT:      Head: Normocephalic.      Nose: Nose normal.      Mouth/Throat:      Mouth: Mucous membranes are moist.   Eyes:      Extraocular Movements: Extraocular  movements intact.      Conjunctiva/sclera: Conjunctivae normal.   Cardiovascular:      Rate and Rhythm: Normal rate and regular rhythm.   Pulmonary:      Effort: Pulmonary effort is normal.      Breath sounds: Normal breath sounds.   Abdominal:      General: Bowel sounds are normal. There is no distension.      Palpations: Abdomen is soft.      Tenderness: There is no abdominal tenderness.   Musculoskeletal:         General: Normal range of motion.      Comments: Using a walker, severe right knee swelling   Skin:     General: Skin is warm.   Neurological:      General: No focal deficit present.      Mental Status: She is alert and oriented to person, place, and time.   Psychiatric:         Mood and Affect: Mood normal.         Judgment: Judgment normal.         Lab Visit on 08/28/2024   Component Date Value    Sodium 08/28/2024 143     Potassium 08/28/2024 4.0     Chloride 08/28/2024 107     CO2 08/28/2024 25     Glucose 08/28/2024 86     Blood Urea Nitrogen 08/28/2024 19.4     Creatinine 08/28/2024 1.00     Calcium 08/28/2024 9.3     Protein Total 08/28/2024 6.9     Albumin 08/28/2024 3.8     Globulin 08/28/2024 3.1     Albumin/Globulin Ratio 08/28/2024 1.2     Bilirubin Total 08/28/2024 0.5     ALP 08/28/2024 79     ALT 08/28/2024 13     AST 08/28/2024 17     eGFR 08/28/2024 56     Anion Gap 08/28/2024 11.0     BUN/Creatinine Ratio 08/28/2024 19     Cancer Antigen 125 08/28/2024 27.3     WBC 08/28/2024 6.77     RBC 08/28/2024 4.32     Hgb 08/28/2024 13.1     Hct 08/28/2024 40.0     MCV 08/28/2024 92.6     MCH 08/28/2024 30.3     MCHC 08/28/2024 32.8 (L)     RDW 08/28/2024 12.9     Platelet 08/28/2024 188     MPV 08/28/2024 9.2     Neut % 08/28/2024 74.8     Lymph % 08/28/2024 14.3     Mono % 08/28/2024 7.7     Eos % 08/28/2024 2.2     Basophil % 08/28/2024 0.7     Lymph # 08/28/2024 0.97     Neut # 08/28/2024 5.06     Mono # 08/28/2024 0.52     Eos # 08/28/2024 0.15     Baso # 08/28/2024 0.05     IG# 08/28/2024  0.02     IG% 08/28/2024 0.3     NRBC% 08/28/2024 0.0           Assessment:       1. History of endometrial cancer    2. Lung nodules      Plan:       Patient with endometrial carcinoma, serous carcinoma, stage IA s/p surgery done 7/31/19.  Tumor was arising in a polyp with a small amount of myometrial invasion.  Per NCCN guidelines, treatment with systemic therapy plus vaginal brachytherapy is preferred.  Dr. Quesada has recommended 6 cycles of Carboplatin/Paclitaxel plus vaginal brachytherapy and I agree with treatment plan.     Started Carbo/Taxol on 9/24/19. Completed her 4th cycle on 11/26/19.   Started experiencing extreme fatigue and NAGEL. Negative work-up by her cardiologist.   CT of chest on 12/5/19--Negative for pulmonary thromboembolic disease.  Mosaic appearance of the lung parenchyma could be seen with edema or small vessel disease. Left lower lobe 6 mm nodule. Recommend attention on cancer surveillance scans.  SOB continued despite Lasix for a few days. Patient treated with oral Prednisone taper and symptoms have now resolved. Suspect this was c/w Taxol pneumonitis.  Chemotherapy discontinued.     Currently patient is doing well without any signs or symptoms to suggest disease recurrence.  Her CA-125 elevated at 47.4 in 2/2022, improved in 4/2022, elevated again in 8/2022.  CT chest w/o contrast 8/12/22 showed development of some new nodular areas, appear to be semi-solid and likely inflammatory. None look really amendable to biopsy, suspect would be low-yield but will refer to pulmonary for evaluation.  CT chest w/o contrast done 11/11/22 improved.  CT C/A/P done 2/14/23 with stable lung findings, some prominent abdominal nodes, some improved, but not enlarged.   CT C/A/P done 8/24/23 with stable findings.   CT C/A/P done 8/28/24 with stable findings, IGNACIO.    Recent labs all good, CA-125 WNL.     No further scans unless any new symptoms.  Patient is now >5 years out from diagnosis/treatment.  Will change  visits to annual.  F/u in 12 months with repeat labs CBC, CMP, CA-125 and visit.     Patient has not made it back to see Dr. Quesada due to COVID.  She has been followed by local GYN - Dr. Pires.      All questions answered at this time.     Annie Leiva MD

## 2024-08-28 ENCOUNTER — HOSPITAL ENCOUNTER (OUTPATIENT)
Dept: RADIOLOGY | Facility: HOSPITAL | Age: 84
Discharge: HOME | End: 2024-08-28
Payer: MEDICARE

## 2024-08-28 DIAGNOSIS — C54.1 PAPILLARY SEROUS ENDOMETRIAL ADENOCARCINOMA: ICD-10-CM

## 2024-08-28 PROCEDURE — 74177 CT ABD & PELVIS W/CONTRAST: CPT | Mod: TC

## 2024-08-28 PROCEDURE — 71260 CT THORAX DX C+: CPT | Mod: TC

## 2024-08-28 PROCEDURE — 25500020 PHARM REV CODE 255

## 2024-08-28 RX ADMIN — DIATRIZOATE MEGLUMINE AND DIATRIZOATE SODIUM 30 ML: 660; 100 LIQUID ORAL; RECTAL at 10:08

## 2024-08-28 RX ADMIN — IOHEXOL 100 ML: 350 INJECTION, SOLUTION INTRAVENOUS at 11:08

## 2024-09-04 ENCOUNTER — OFFICE VISIT (OUTPATIENT)
Dept: HEMATOLOGY/ONCOLOGY | Facility: CLINIC | Age: 84
End: 2024-09-04
Payer: MEDICARE

## 2024-09-04 VITALS
SYSTOLIC BLOOD PRESSURE: 162 MMHG | HEART RATE: 67 BPM | RESPIRATION RATE: 14 BRPM | WEIGHT: 185.13 LBS | HEIGHT: 70 IN | DIASTOLIC BLOOD PRESSURE: 83 MMHG | TEMPERATURE: 98 F | BODY MASS INDEX: 26.5 KG/M2 | OXYGEN SATURATION: 97 %

## 2024-09-04 DIAGNOSIS — R91.8 LUNG NODULES: ICD-10-CM

## 2024-09-04 DIAGNOSIS — Z85.42 HISTORY OF ENDOMETRIAL CANCER: Primary | ICD-10-CM

## 2024-09-04 PROCEDURE — 99213 OFFICE O/P EST LOW 20 MIN: CPT | Mod: PBBFAC | Performed by: INTERNAL MEDICINE

## 2024-09-04 PROCEDURE — 99999 PR PBB SHADOW E&M-EST. PATIENT-LVL III: CPT | Mod: PBBFAC,,, | Performed by: INTERNAL MEDICINE

## 2024-09-04 PROCEDURE — 99214 OFFICE O/P EST MOD 30 MIN: CPT | Mod: S$PBB,,, | Performed by: INTERNAL MEDICINE

## (undated) DEVICE — SEAL UNIVERSAL 5MM-8MM XI

## (undated) DEVICE — COVER TIP CURVED SCISSORS XI

## (undated) DEVICE — WARMER DRAPE STERILE LF

## (undated) DEVICE — KIT ANTIFOG

## (undated) DEVICE — SEE MEDLINE ITEM 152622

## (undated) DEVICE — TRAY FOLEY 16FR INFECTION CONT

## (undated) DEVICE — SPONGE LAP 18X18 PREWASHED

## (undated) DEVICE — OBTURATOR BLADELESS 8MM XI

## (undated) DEVICE — ELECTRODE EXTENDED BLADE

## (undated) DEVICE — SUT 1 48IN PDS II VIO MONO

## (undated) DEVICE — COVER LIGHT HANDLE 80/CA

## (undated) DEVICE — IRRIGATOR ENDOSCOPY DISP.

## (undated) DEVICE — SUT 0 54IN COATED VICRYL U

## (undated) DEVICE — SUT CTD VICRYL 0 VIL BR/CT

## (undated) DEVICE — SUT MCRYL PLUS 4-0 PS2 27IN

## (undated) DEVICE — SEE MEDLINE ITEM 154981

## (undated) DEVICE — OBTURATOR BLADELESS 8MM XI CLR

## (undated) DEVICE — NDL INSUF ULTRA VERESS 120MM

## (undated) DEVICE — DRESSING ABSRBNT ISLAND 3.6X8

## (undated) DEVICE — SYR 50CC LL

## (undated) DEVICE — SUT CTD VICRYL 0 UND BR CT

## (undated) DEVICE — COVER LIGHT HANDLE

## (undated) DEVICE — SEE MEDLINE ITEM 157181

## (undated) DEVICE — LEGGINGS 48X31 INCH

## (undated) DEVICE — DRAPE SCOPE PILLOW WARMER

## (undated) DEVICE — CLIPPER BLADE MOD 4406 (CAREF)

## (undated) DEVICE — DRAPE ARM DAVINCI XI

## (undated) DEVICE — SOL ELECTROLUBE ANTI-STIC

## (undated) DEVICE — Device

## (undated) DEVICE — LOOP VESSEL YELLOW MAXI

## (undated) DEVICE — SCISSOR 5MMX35CM DIRECT DRIVE

## (undated) DEVICE — SYR 30CC LUER LOCK

## (undated) DEVICE — NDL 22GA X1 1/2 REG BEVEL

## (undated) DEVICE — SEE MEDLINE ITEM 146417

## (undated) DEVICE — GOWN SURGICAL X-LARGE

## (undated) DEVICE — APPLICATOR CHLORAPREP ORN 26ML

## (undated) DEVICE — DRAPE ABDOMINAL TIBURON 14X11

## (undated) DEVICE — SUT 0 18IN COATED VICRYL V

## (undated) DEVICE — SUT CTD VICRYL 0 UND BR

## (undated) DEVICE — MANIPULATOR VCARE PLUS 32MM SM

## (undated) DEVICE — SEE MEDLINE ITEM 156902

## (undated) DEVICE — TRAY MINOR GEN SURG

## (undated) DEVICE — CLOSURE SKIN STERI STRIP 1/2X4

## (undated) DEVICE — LUBRICANT SURGILUBE 2 OZ

## (undated) DEVICE — PORT ACCESS 8MM W/120MM LOW

## (undated) DEVICE — SUT V-LOC 180 ABD 2/0 GS-21

## (undated) DEVICE — DRAPE STERI INSTRUMENT 1018

## (undated) DEVICE — NDL 20GX1-1/2IN IB

## (undated) DEVICE — BLADE SURG CARBON STEEL SZ11

## (undated) DEVICE — ELECTRODE REM PLYHSV RETURN 9

## (undated) DEVICE — SET TRI-LUMEN FILTERED TUBE

## (undated) DEVICE — DRAPE COLUMN DAVINCI XI

## (undated) DEVICE — SYR 10CC LUER LOCK

## (undated) DEVICE — SEE MEDLINE ITEM 157148